# Patient Record
Sex: FEMALE | Race: WHITE | NOT HISPANIC OR LATINO | Employment: FULL TIME | URBAN - METROPOLITAN AREA
[De-identification: names, ages, dates, MRNs, and addresses within clinical notes are randomized per-mention and may not be internally consistent; named-entity substitution may affect disease eponyms.]

---

## 2017-01-19 ENCOUNTER — OFFICE VISIT (OUTPATIENT)
Dept: FAMILY MEDICINE CLINIC | Facility: CLINIC | Age: 42
End: 2017-01-19

## 2017-01-19 VITALS
SYSTOLIC BLOOD PRESSURE: 120 MMHG | DIASTOLIC BLOOD PRESSURE: 82 MMHG | WEIGHT: 155 LBS | RESPIRATION RATE: 16 BRPM | HEIGHT: 69 IN | BODY MASS INDEX: 22.96 KG/M2

## 2017-01-19 DIAGNOSIS — R10.30 LOWER ABDOMINAL PAIN: Primary | ICD-10-CM

## 2017-01-19 PROCEDURE — 99213 OFFICE O/P EST LOW 20 MIN: CPT | Performed by: INTERNAL MEDICINE

## 2017-01-19 RX ORDER — OMEPRAZOLE 40 MG/1
CAPSULE, DELAYED RELEASE ORAL
Qty: 90 CAPSULE | Refills: 1 | Status: SHIPPED | OUTPATIENT
Start: 2017-01-19 | End: 2018-01-04

## 2017-01-19 RX ORDER — OMEPRAZOLE 40 MG/1
40 CAPSULE, DELAYED RELEASE ORAL DAILY
Qty: 30 CAPSULE | Refills: 1 | Status: SHIPPED | OUTPATIENT
Start: 2017-01-19 | End: 2017-01-19 | Stop reason: SDUPTHER

## 2017-01-19 NOTE — MR AVS SNAPSHOT
Marlene Evangelista   1/19/2017 4:00 PM   Office Visit    Dept Phone:  [phone]   Encounter #:  [id number]    Provider:  Shawn Romero MD   Department:  [facility]                Your Full Care Plan              Today's Medication Changes          These changes are accurate as of: 1/19/17  4:08 PM.  If you have any questions, ask your nurse or doctor.               New Medication(s)Ordered:     omeprazole 40 MG capsule   Commonly known as:  PRILOSEC   Take 1 capsule by mouth Daily.   Started by:  Shawn Romero MD            Where to Get Your Medications      These medications were sent to [email or web address] [address] [place] [address] [address] [facility] [address] AT [facility] [phone] [facility] [phone]   [address] [facility] [address], [facility] [address]     Phone:  [phone]     omeprazole 40 MG capsule                  Your Updated Medication List          This list is accurate as of: 1/19/17  4:08 PM.  Always use your most recent med list.                acyclovir 200 MG capsule   Commonly known as:  ZOVIRAX   Take 1 capsule by mouth 5 (five) times a day.       FLUoxetine 40 MG capsule   Commonly known as:  PROzac       omeprazole 40 MG capsule   Commonly known as:  PRILOSEC   Take 1 capsule by mouth Daily.               You Were Diagnosed With        Codes Comments    Lower abdominal pain    -  Primary ICD-10-CM: R10.30  ICD-9-CM: 789.09       Instructions     None    Patient Instructions History      Upcoming Appointments     Visit Type Date Time Department    OFFICE VISIT 1/19/2017  4:00 PM [employer]    FOLLOW UP 1/26/2017  3:45 PM [facility] Signup     [facility] [employer] allows you to send messages to your doctor, view your test results, renew your prescriptions, schedule appointments, and more. To sign up, go to [employer] and click on the Sign Up Now link in the New  "User? box. Enter your [employer] Activation Code exactly as it appears below along with the last four digits of your Social Security Number and your Date of Birth () to complete the sign-up process. If you do not sign up before the expiration date, you must request a new code.    [employer] Activation Code: [email or web address]  Expires: 2017  4:08 PM    If you have questions, you can email [email or web address] or call [phone] to talk to our [employer] staff. Remember, [employer] is NOT to be used for urgent needs. For medical emergencies, dial 911.               Other Info from Your Visit           Your Appointments     2017  3:45 PM EST   Follow Up with Shawn Romero MD   [facility] (--)    [address] [facility] [address] [phone]           Arrive 15 minutes prior to appointment.              Allergies     No Known Allergies      Reason for Visit     Abdominal Pain patient c/o stomach burning      Vital Signs     Blood Pressure Respirations Height Weight Body Mass Index Smoking Status    120/82 16 69\" (175.3 cm) 155 lb (70.3 kg) 22.89 kg/m2 Former Smoker      Problems and Diagnoses Noted     Lower abdominal pain    -  Primary        "

## 2017-01-19 NOTE — PROGRESS NOTES
Subjective   Marlene Evangelista is a 41 y.o. female. Patient is here today for some burning abdominal pain that's been going on about a month.  She can't tied in with any special foods.  It seems to be mid to lower abdomen and is not associated with eating or bowel movements.  She has been taking some Pepcid that's not helping dramatically.  She denies any fevers or chills and the pain is not present all the time and is not excruciating.  She has had increased stress lately and wonders if that's playing a role.  There are no urinary symptoms and she has had a hysterectomy.    Chief Complaint   Patient presents with   • Abdominal Pain     patient c/o stomach burning          Vitals:    01/19/17 1536   BP: 120/82   Resp: 16     The following portions of the patient's history were reviewed and updated as appropriate: allergies, current medications, past family history, past medical history, past social history, past surgical history and problem list.    Past Medical History   Diagnosis Date   • Anxiety    • Vitamin D deficiency       No Known Allergies   Social History     Social History   • Marital status:      Spouse name: N/A   • Number of children: N/A   • Years of education: N/A     Occupational History   • Not on file.     Social History Main Topics   • Smoking status: Former Smoker   • Smokeless tobacco: Not on file      Comment: quit 2008   • Alcohol use Yes      Comment: 5 aweek   • Drug use: Not on file   • Sexual activity: Not on file     Other Topics Concern   • Not on file     Social History Narrative        Current Outpatient Prescriptions:   •  acyclovir (ZOVIRAX) 200 MG capsule, Take 1 capsule by mouth 5 (five) times a day., Disp: 60 capsule, Rfl: 5  •  FLUoxetine (PROzac) 40 MG capsule, , Disp: , Rfl:   •  omeprazole (PRILOSEC) 40 MG capsule, Take 1 capsule by mouth Daily., Disp: 30 capsule, Rfl: 1     Objective     History of Present Illness     Review of Systems   Constitutional: Negative.     HENT: Negative.    Eyes: Negative.    Respiratory: Negative.    Cardiovascular: Negative.    Gastrointestinal: Positive for abdominal pain. Negative for constipation, diarrhea and vomiting.   Genitourinary: Negative.    Musculoskeletal: Negative.    Skin: Negative.    Neurological: Negative.    Psychiatric/Behavioral: Negative.        Physical Exam   Constitutional: She is oriented to person, place, and time. She appears well-developed and well-nourished.   Pleasant, cooperative and in no acute distress.   HENT:   Head: Normocephalic and atraumatic.   Eyes: Conjunctivae are normal. Pupils are equal, round, and reactive to light.   Neck: Normal range of motion.   Cardiovascular: Normal rate, regular rhythm and normal heart sounds.    Pulmonary/Chest: Effort normal and breath sounds normal. No respiratory distress. She has no wheezes. She has no rales.   Abdominal: Soft. Bowel sounds are normal. She exhibits no distension and no mass. There is no tenderness. There is no rebound and no guarding.   Musculoskeletal: Normal range of motion. She exhibits no edema.   Neurological: She is alert and oriented to person, place, and time.   Skin: Skin is warm and dry.   Psychiatric: She has a normal mood and affect. Her behavior is normal.   Nursing note and vitals reviewed.      ASSESSMENT  patient's having some burning epigastric pain that's intermittent and is generally mid to lower abdomen.  There is no associated findings, no nausea vomiting change in bowel movements and no food associations.  She's been trying some Pepcid.  She has had some increased stress.  Her abdominal exam is completely benign and certainly nonsurgical     Problem List Items Addressed This Visit     None      Visit Diagnoses     Lower abdominal pain    -  Primary          PLAN  I'm going to try the patient on a course of omeprazole 40 mg daily and I would like to recheck her next week and make sure that things are calming down.  I don't believe she  needs endoscopy currently.      There are no Patient Instructions on file for this visit.  Return in about 1 week (around 1/26/2017).

## 2017-06-14 ENCOUNTER — APPOINTMENT (OUTPATIENT)
Dept: WOMENS IMAGING | Facility: HOSPITAL | Age: 42
End: 2017-06-14

## 2017-06-14 PROCEDURE — 77062 BREAST TOMOSYNTHESIS BI: CPT | Performed by: RADIOLOGY

## 2017-06-14 PROCEDURE — 77066 DX MAMMO INCL CAD BI: CPT | Performed by: RADIOLOGY

## 2017-12-20 ENCOUNTER — APPOINTMENT (OUTPATIENT)
Dept: WOMENS IMAGING | Facility: HOSPITAL | Age: 42
End: 2017-12-20

## 2017-12-20 PROCEDURE — 77067 SCR MAMMO BI INCL CAD: CPT | Performed by: RADIOLOGY

## 2017-12-20 PROCEDURE — 77063 BREAST TOMOSYNTHESIS BI: CPT | Performed by: RADIOLOGY

## 2018-01-04 ENCOUNTER — OFFICE VISIT (OUTPATIENT)
Dept: FAMILY MEDICINE CLINIC | Facility: CLINIC | Age: 43
End: 2018-01-04

## 2018-01-04 VITALS
SYSTOLIC BLOOD PRESSURE: 100 MMHG | WEIGHT: 169.2 LBS | BODY MASS INDEX: 25.06 KG/M2 | DIASTOLIC BLOOD PRESSURE: 72 MMHG | TEMPERATURE: 98 F | HEIGHT: 69 IN | OXYGEN SATURATION: 98 % | HEART RATE: 98 BPM

## 2018-01-04 DIAGNOSIS — M79.671 FOOT PAIN, RIGHT: Primary | ICD-10-CM

## 2018-01-04 PROCEDURE — 73630 X-RAY EXAM OF FOOT: CPT | Performed by: INTERNAL MEDICINE

## 2018-01-04 PROCEDURE — 99213 OFFICE O/P EST LOW 20 MIN: CPT | Performed by: INTERNAL MEDICINE

## 2018-01-04 RX ORDER — CETIRIZINE HYDROCHLORIDE 5 MG/1
5 TABLET ORAL DAILY
COMMUNITY
End: 2018-07-23

## 2018-01-04 NOTE — PROGRESS NOTES
Subjective   Marlene Evangelista is a 42 y.o. female. Patient is here today for pain in her right foot that's been going on for about a month.  She has been exercising vigorously and initially had swelling significantly in the right foot with pain in the right mid arch area and below the medial calcaneus.  Swelling has gradually decreased over the past month but she continues with some pain in the arch area although it is improved.  She never had any bruising.  She is able to ambulate on it just wants to make sure that nothing is broken.  Chief Complaint   Patient presents with   • Ankle Pain     PT HAVING RIGHT ANKLE PAIN AND SWELLING- NO INJURY THAT SHE KNOWS OF           Vitals:    01/04/18 1311   BP: 100/72   Pulse: 98   Temp: 98 °F (36.7 °C)   SpO2: 98%     The following portions of the patient's history were reviewed and updated as appropriate: allergies, current medications, past family history, past medical history, past social history, past surgical history and problem list.    Past Medical History:   Diagnosis Date   • Anxiety    • Vitamin D deficiency       No Known Allergies   Social History     Social History   • Marital status:      Spouse name: N/A   • Number of children: N/A   • Years of education: N/A     Occupational History   • Not on file.     Social History Main Topics   • Smoking status: Former Smoker   • Smokeless tobacco: Not on file      Comment: quit 2008   • Alcohol use Yes      Comment: 5 aweek   • Drug use: Not on file   • Sexual activity: Not on file     Other Topics Concern   • Not on file     Social History Narrative        Current Outpatient Prescriptions:   •  cetirizine (zyrTEC) 5 MG tablet, Take 5 mg by mouth Daily., Disp: , Rfl:   •  FLUoxetine (PROzac) 40 MG capsule, , Disp: , Rfl:      Objective     History of Present Illness     Review of Systems   Constitutional: Negative.    HENT: Negative.    Eyes: Negative.    Respiratory: Negative.    Cardiovascular: Negative.     Gastrointestinal: Negative.    Endocrine: Negative.    Genitourinary: Negative.    Neurological: Negative.    Psychiatric/Behavioral: Negative.        Physical Exam   Constitutional:   Generally healthy-appearing.   Musculoskeletal: Normal range of motion. She exhibits no edema.   Patient's right foot exhibits no swelling that I can see, no erythema and no bruising.  There is tenderness to palpation in the right mid arch area that's just mild to moderate.  The ankle joint seems intact.   Neurological: She is alert.   Skin: Skin is warm and dry.   Psychiatric: She has a normal mood and affect. Her behavior is normal.   Nursing note and vitals reviewed.      ASSESSMENT  patient has pain in the right mid arch area that is gradually improving.  X-ray of her right foot shows no bony injury that I can appreciate.  The patient does not feel that she needs an orthopedic referral currently.     Problem List Items Addressed This Visit     None      Visit Diagnoses     Foot pain, right    -  Primary          PLAN  She will moderate activities and advance them as tolerated based on the pain and can use some ibuprofen as needed.  She will return for follow-up if this does not resolve.    There are no Patient Instructions on file for this visit.  No Follow-up on file.

## 2018-01-16 DIAGNOSIS — F41.9 ANXIETY: ICD-10-CM

## 2018-01-16 DIAGNOSIS — Z00.00 ROUTINE HEALTH MAINTENANCE: Primary | ICD-10-CM

## 2018-01-23 ENCOUNTER — CLINICAL SUPPORT (OUTPATIENT)
Dept: FAMILY MEDICINE CLINIC | Facility: CLINIC | Age: 43
End: 2018-01-23

## 2018-01-23 DIAGNOSIS — Z00.00 ENCOUNTER FOR PREVENTIVE HEALTH EXAMINATION: ICD-10-CM

## 2018-01-23 DIAGNOSIS — Z00.00 ROUTINE HEALTH MAINTENANCE: Primary | ICD-10-CM

## 2018-01-23 LAB
ALBUMIN SERPL-MCNC: 4.6 G/DL (ref 3.5–5.2)
ALBUMIN/GLOB SERPL: 1.6 G/DL
ALP SERPL-CCNC: 54 U/L (ref 39–117)
ALT SERPL-CCNC: 13 U/L (ref 1–33)
APPEARANCE UR: CLEAR
AST SERPL-CCNC: 20 U/L (ref 1–32)
BILIRUB SERPL-MCNC: 0.7 MG/DL (ref 0.1–1.2)
BILIRUB UR QL STRIP: NEGATIVE
BUN SERPL-MCNC: 18 MG/DL (ref 6–20)
BUN/CREAT SERPL: 21.2 (ref 7–25)
CALCIUM SERPL-MCNC: 9.4 MG/DL (ref 8.6–10.5)
CHLORIDE SERPL-SCNC: 100 MMOL/L (ref 98–107)
CHOLEST SERPL-MCNC: 237 MG/DL (ref 0–200)
CO2 SERPL-SCNC: 26.6 MMOL/L (ref 22–29)
COLOR UR: YELLOW
CREAT SERPL-MCNC: 0.85 MG/DL (ref 0.57–1)
GLOBULIN SER CALC-MCNC: 2.8 GM/DL
GLUCOSE SERPL-MCNC: 86 MG/DL (ref 65–99)
GLUCOSE UR QL: NEGATIVE
HDLC SERPL-MCNC: 64 MG/DL (ref 40–60)
HGB UR QL STRIP: NEGATIVE
KETONES UR QL STRIP: NEGATIVE
LDLC SERPL CALC-MCNC: 151 MG/DL (ref 0–100)
LDLC/HDLC SERPL: 2.36 {RATIO}
LEUKOCYTE ESTERASE UR QL STRIP: NEGATIVE
NITRITE UR QL STRIP: NEGATIVE
PH UR STRIP: 7 [PH] (ref 5–8)
POTASSIUM SERPL-SCNC: 4.8 MMOL/L (ref 3.5–5.2)
PROT SERPL-MCNC: 7.4 G/DL (ref 6–8.5)
PROT UR QL STRIP: NEGATIVE
SODIUM SERPL-SCNC: 141 MMOL/L (ref 136–145)
SP GR UR: 1.02 (ref 1–1.03)
TRIGL SERPL-MCNC: 111 MG/DL (ref 0–150)
TSH SERPL DL<=0.005 MIU/L-ACNC: 2.3 MIU/ML (ref 0.27–4.2)
UROBILINOGEN UR STRIP-MCNC: NORMAL MG/DL
VLDLC SERPL CALC-MCNC: 22.2 MG/DL (ref 5–40)

## 2018-01-23 PROCEDURE — 71046 X-RAY EXAM CHEST 2 VIEWS: CPT | Performed by: INTERNAL MEDICINE

## 2018-01-23 PROCEDURE — 93000 ELECTROCARDIOGRAM COMPLETE: CPT | Performed by: INTERNAL MEDICINE

## 2018-01-23 PROCEDURE — 85025 COMPLETE CBC W/AUTO DIFF WBC: CPT | Performed by: INTERNAL MEDICINE

## 2018-01-30 ENCOUNTER — OFFICE VISIT (OUTPATIENT)
Dept: FAMILY MEDICINE CLINIC | Facility: CLINIC | Age: 43
End: 2018-01-30

## 2018-01-30 VITALS
HEIGHT: 69 IN | SYSTOLIC BLOOD PRESSURE: 106 MMHG | OXYGEN SATURATION: 98 % | WEIGHT: 174.4 LBS | HEART RATE: 79 BPM | DIASTOLIC BLOOD PRESSURE: 70 MMHG | TEMPERATURE: 98.6 F | BODY MASS INDEX: 25.83 KG/M2

## 2018-01-30 DIAGNOSIS — Z00.00 ANNUAL PHYSICAL EXAM: ICD-10-CM

## 2018-01-30 DIAGNOSIS — Z00.00 HEALTH MAINTENANCE EXAMINATION: ICD-10-CM

## 2018-01-30 DIAGNOSIS — Z83.71 FAMILY HISTORY OF COLONIC POLYPS: ICD-10-CM

## 2018-01-30 DIAGNOSIS — M41.24 OTHER IDIOPATHIC SCOLIOSIS, THORACIC REGION: ICD-10-CM

## 2018-01-30 DIAGNOSIS — E78.5 HYPERLIPIDEMIA, UNSPECIFIED HYPERLIPIDEMIA TYPE: ICD-10-CM

## 2018-01-30 DIAGNOSIS — B00.1 RECURRENT COLD SORES: ICD-10-CM

## 2018-01-30 DIAGNOSIS — F41.1 ANXIETY, GENERALIZED: ICD-10-CM

## 2018-01-30 DIAGNOSIS — Z12.11 ENCOUNTER FOR SCREENING COLONOSCOPY: ICD-10-CM

## 2018-01-30 PROCEDURE — 99396 PREV VISIT EST AGE 40-64: CPT | Performed by: INTERNAL MEDICINE

## 2018-01-30 PROCEDURE — 71046 X-RAY EXAM CHEST 2 VIEWS: CPT | Performed by: INTERNAL MEDICINE

## 2018-01-30 RX ORDER — PRAVASTATIN SODIUM 20 MG
20 TABLET ORAL DAILY
Qty: 90 TABLET | Refills: 3 | Status: SHIPPED | OUTPATIENT
Start: 2018-01-30 | End: 2019-01-21

## 2018-01-30 RX ORDER — ACYCLOVIR 200 MG/1
200 CAPSULE ORAL
Qty: 60 CAPSULE | Refills: 5 | Status: SHIPPED | OUTPATIENT
Start: 2018-01-30 | End: 2018-07-23

## 2018-01-30 NOTE — PROGRESS NOTES
Subjective   Marlene Evangelista is a 42 y.o. female. Patient is here today for a complete physical exam and follow-up on her hyperlipidemia and anxiety.  She generally is feeling well and has no new acute complaints.  She's had no chest pain, shortness of breath, edema or myalgias and is doing well on the Prozac.  PMH-history of a hysterectomy in 2015.  SH-patient now is  and dating again.  She is a nonsmoker and has about 6 glasses of wine per week.  She has regular gynecologic visits and is up-to-date on Pap smear and mammograms.  She has regular dental in vision checks.  Chief Complaint   Patient presents with   • Annual Exam     PT HERE FOR CPE          Vitals:    01/30/18 1052   BP: 106/70   Pulse: 79   Temp: 98.6 °F (37 °C)   SpO2: 98%     The following portions of the patient's history were reviewed and updated as appropriate: allergies, current medications, past family history, past medical history, past social history, past surgical history and problem list.    Past Medical History:   Diagnosis Date   • Anxiety    • Vitamin D deficiency       No Known Allergies   Social History     Social History   • Marital status:      Spouse name: N/A   • Number of children: N/A   • Years of education: N/A     Occupational History   • Not on file.     Social History Main Topics   • Smoking status: Former Smoker   • Smokeless tobacco: Not on file      Comment: quit 2008   • Alcohol use Yes      Comment: 5 aweek   • Drug use: Not on file   • Sexual activity: Not on file     Other Topics Concern   • Not on file     Social History Narrative        Current Outpatient Prescriptions:   •  cetirizine (zyrTEC) 5 MG tablet, Take 5 mg by mouth Daily., Disp: , Rfl:   •  FLUoxetine (PROzac) 40 MG capsule, , Disp: , Rfl:      Objective     History of Present Illness     Review of Systems   Constitutional: Negative.    HENT: Negative.    Eyes: Negative.    Respiratory: Negative.    Cardiovascular: Negative.     Gastrointestinal: Negative.    Endocrine: Negative.    Genitourinary: Negative.    Musculoskeletal: Negative.    Skin: Negative.    Allergic/Immunologic: Positive for environmental allergies.   Neurological: Negative.    Hematological: Negative.    Psychiatric/Behavioral: Negative.        Physical Exam   Constitutional: She is oriented to person, place, and time. She appears well-developed and well-nourished.   Pleasant, cooperative and in no distress with a blood pressure 120/80   HENT:   Head: Normocephalic and atraumatic.   Right Ear: Hearing, tympanic membrane, external ear and ear canal normal.   Left Ear: Hearing, tympanic membrane, external ear and ear canal normal.   Nose: Nose normal. Right sinus exhibits no maxillary sinus tenderness and no frontal sinus tenderness. Left sinus exhibits no maxillary sinus tenderness and no frontal sinus tenderness.   Mouth/Throat: Uvula is midline, oropharynx is clear and moist and mucous membranes are normal. No tonsillar exudate.   Eyes: Conjunctivae are normal. Pupils are equal, round, and reactive to light. No scleral icterus.   Neck: Normal range of motion. Neck supple. No JVD present. No tracheal deviation present. No thyromegaly present.   Cardiovascular: Normal rate, regular rhythm, normal heart sounds and intact distal pulses.  Exam reveals no gallop and no friction rub.    No murmur heard.  Pulmonary/Chest: Effort normal and breath sounds normal. No stridor. No respiratory distress. She has no wheezes. She has no rales. She exhibits no tenderness.   Abdominal: Soft. Bowel sounds are normal. She exhibits no distension and no mass. There is no tenderness. There is no rebound and no guarding. No hernia.   Musculoskeletal: Normal range of motion. She exhibits no edema or tenderness.   There is a probable small ganglion cyst on the radial aspect of the left wrist, not bothering the patient   Lymphadenopathy:     She has no cervical adenopathy.   Neurological: She is  alert and oriented to person, place, and time. She has normal reflexes.   Skin: Skin is warm and dry.   Tattoos especially on the left arm but also on the right arm and back   Psychiatric: She has a normal mood and affect. Her behavior is normal. Judgment and thought content normal.   Nursing note and vitals reviewed.      ASSESSMENT  EKG is normal.  The chest x-ray is stable showing some dextroscoliosis but otherwise normal and unchanged from a film in 2016.  CBC is normal.  CMP, urinalysis and TSH were all completely normal.  Lipid panel is elevated with a total cholesterol 237, a good HDL of 64 but an LDL of 151 and triglycerides of 111.  #1-hyperlipidemia, not adequately controlled  #2-history of anxiety controlled on medication  #3-dextroscoliosis of the thoracic spine, asymptomatic  #4-family history of colon polyps  #5-episodic cold sores     Problem List Items Addressed This Visit        Cardiovascular and Mediastinum    HLD (hyperlipidemia)       Other    Anxiety, generalized    Health maintenance examination      Other Visit Diagnoses     Annual physical exam    -  Primary    Relevant Orders    XR Chest PA & Lateral (Completed)          PLAN  I'm going to start the patient on pravastatin 20 mg daily.  I refilled her acyclovir.  I'm referring her to the general surgery group for routine colonoscopy.  I'd like to recheck the patient in 4 months with a CMP and lipid panel    There are no Patient Instructions on file for this visit.  No Follow-up on file.

## 2018-02-08 ENCOUNTER — OFFICE VISIT (OUTPATIENT)
Dept: FAMILY MEDICINE CLINIC | Facility: CLINIC | Age: 43
End: 2018-02-08

## 2018-02-08 VITALS
HEIGHT: 69 IN | HEART RATE: 92 BPM | WEIGHT: 172 LBS | DIASTOLIC BLOOD PRESSURE: 84 MMHG | OXYGEN SATURATION: 98 % | BODY MASS INDEX: 25.48 KG/M2 | RESPIRATION RATE: 16 BRPM | SYSTOLIC BLOOD PRESSURE: 124 MMHG | TEMPERATURE: 99.6 F

## 2018-02-08 DIAGNOSIS — J10.1 INFLUENZA A: ICD-10-CM

## 2018-02-08 DIAGNOSIS — R50.9 FEVER, UNSPECIFIED FEVER CAUSE: Primary | ICD-10-CM

## 2018-02-08 LAB
EXPIRATION DATE: ABNORMAL
FLUAV AG NPH QL: ABNORMAL
FLUBV AG NPH QL: NEGATIVE
INTERNAL CONTROL: ABNORMAL
Lab: ABNORMAL

## 2018-02-08 PROCEDURE — 99213 OFFICE O/P EST LOW 20 MIN: CPT | Performed by: INTERNAL MEDICINE

## 2018-02-08 PROCEDURE — 87804 INFLUENZA ASSAY W/OPTIC: CPT | Performed by: INTERNAL MEDICINE

## 2018-02-08 RX ORDER — GUAIFENESIN AND CODEINE PHOSPHATE 100; 10 MG/5ML; MG/5ML
5 SOLUTION ORAL 3 TIMES DAILY PRN
Qty: 180 ML | Refills: 0 | Status: SHIPPED | OUTPATIENT
Start: 2018-02-08 | End: 2018-07-23

## 2018-02-08 RX ORDER — OSELTAMIVIR PHOSPHATE 75 MG/1
75 CAPSULE ORAL 2 TIMES DAILY
Qty: 10 CAPSULE | Refills: 0 | Status: SHIPPED | OUTPATIENT
Start: 2018-02-08 | End: 2018-07-23

## 2018-02-11 PROBLEM — J10.1 INFLUENZA A: Status: ACTIVE | Noted: 2018-02-11

## 2018-02-11 NOTE — PROGRESS NOTES
Subjective   Marlene Evangelista is a 42 y.o. female. Patient is here today for   Chief Complaint   Patient presents with   • Nasal Congestion     pt states feels drowsy started 02/06/18   • Cough   • Vomiting     pt states has done it 2x yesterday and today    • Fever          Vitals:    02/08/18 1339   BP: 124/84   Pulse: 92   Resp: 16   Temp: 99.6 °F (37.6 °C)   SpO2: 98%       Past Medical History:   Diagnosis Date   • Anxiety    • Vitamin D deficiency       No Known Allergies   Social History     Social History   • Marital status:      Spouse name: N/A   • Number of children: N/A   • Years of education: N/A     Occupational History   • Not on file.     Social History Main Topics   • Smoking status: Former Smoker   • Smokeless tobacco: Former User      Comment: quit 2008   • Alcohol use Yes      Comment: 5 aweek   • Drug use: Not on file   • Sexual activity: Not on file     Other Topics Concern   • Not on file     Social History Narrative        Current Outpatient Prescriptions:   •  acyclovir (ZOVIRAX) 200 MG capsule, Take 1 capsule by mouth 5 (Five) Times a Day., Disp: 60 capsule, Rfl: 5  •  cetirizine (zyrTEC) 5 MG tablet, Take 5 mg by mouth Daily., Disp: , Rfl:   •  FLUoxetine (PROzac) 40 MG capsule, , Disp: , Rfl:   •  guaifenesin-codeine (GUAIFENESIN AC) 100-10 MG/5ML liquid, Take 5 mL by mouth 3 (Three) Times a Day As Needed for Cough., Disp: 180 mL, Rfl: 0  •  oseltamivir (TAMIFLU) 75 MG capsule, Take 1 capsule by mouth 2 (Two) Times a Day., Disp: 10 capsule, Rfl: 0  •  pravastatin (PRAVACHOL) 20 MG tablet, Take 1 tablet by mouth Daily., Disp: 90 tablet, Rfl: 3     Objective     HPI Comments: She has had fevers and chills and a cough at home.  She has myalgias.    Cough   Associated symptoms include a fever and myalgias.   Vomiting    Associated symptoms include coughing, a fever and myalgias.   Fever    Associated symptoms include coughing and vomiting.        Review of Systems   Constitutional:  Positive for fever.   Respiratory: Positive for cough.    Gastrointestinal: Positive for vomiting.   Musculoskeletal: Positive for myalgias.       Physical Exam   Constitutional: She is oriented to person, place, and time. She appears well-developed and well-nourished.   HENT:   Head: Normocephalic and atraumatic.   Cardiovascular: Normal rate, regular rhythm and normal heart sounds.    No murmur heard.  Pulmonary/Chest: Effort normal and breath sounds normal. No respiratory distress.   Neurological: She is alert and oriented to person, place, and time.   Psychiatric: She has a normal mood and affect. Her behavior is normal.   Nursing note and vitals reviewed.        Problem List Items Addressed This Visit        Respiratory    Influenza A    Relevant Medications    oseltamivir (TAMIFLU) 75 MG capsule      Other Visit Diagnoses     Fever, unspecified fever cause    -  Primary    Relevant Orders    POC Influenza A / B (Completed)            PLAN  She has influenza A.  The gave her Tamiflu 75 mg 1 by mouth twice a day.  I gave her prescription for codeine cough syrup to use when necessary cough.    She may use Tylenol or ibuprofen alternatingly for fevers or chills as needed.  I asked her to drink plenty of fluids and stay home and rest this weekend.  She will not be going to work today or tomorrow.  No Follow-up on file.

## 2018-05-23 DIAGNOSIS — E78.5 HYPERLIPIDEMIA, UNSPECIFIED HYPERLIPIDEMIA TYPE: Primary | ICD-10-CM

## 2018-07-23 ENCOUNTER — OFFICE VISIT (OUTPATIENT)
Dept: FAMILY MEDICINE CLINIC | Facility: CLINIC | Age: 43
End: 2018-07-23

## 2018-07-23 VITALS
SYSTOLIC BLOOD PRESSURE: 116 MMHG | TEMPERATURE: 98.2 F | HEIGHT: 69 IN | HEART RATE: 78 BPM | OXYGEN SATURATION: 98 % | DIASTOLIC BLOOD PRESSURE: 72 MMHG | BODY MASS INDEX: 25.92 KG/M2 | RESPIRATION RATE: 16 BRPM | WEIGHT: 175 LBS

## 2018-07-23 DIAGNOSIS — J40 BRONCHITIS: Primary | ICD-10-CM

## 2018-07-23 DIAGNOSIS — R09.82 PND (POST-NASAL DRIP): ICD-10-CM

## 2018-07-23 PROCEDURE — 99214 OFFICE O/P EST MOD 30 MIN: CPT | Performed by: INTERNAL MEDICINE

## 2018-07-23 RX ORDER — FLUTICASONE PROPIONATE 50 MCG
2 SPRAY, SUSPENSION (ML) NASAL DAILY
Qty: 18.2 BOTTLE | Refills: 5 | Status: SHIPPED | OUTPATIENT
Start: 2018-07-23 | End: 2019-01-21

## 2018-07-23 RX ORDER — AZITHROMYCIN 250 MG/1
TABLET, FILM COATED ORAL
Qty: 6 TABLET | Refills: 0 | Status: SHIPPED | OUTPATIENT
Start: 2018-07-23 | End: 2018-10-05

## 2018-07-23 NOTE — PATIENT INSTRUCTIONS
Suggest cetirizine 10 mg daily and starting fluticasone nasal spray at one puff twice a day.  Start azithromycin as instructed and drink plenty of fluids.

## 2018-07-23 NOTE — PROGRESS NOTES
Subjective   Marlene Evangelista is a 43 y.o. female. Patient is here today for   Chief Complaint   Patient presents with   • Nasal Congestion     chest congestion x 1 month    • Cough          Vitals:    07/23/18 1016   BP: 116/72   Pulse: 78   Resp: 16   Temp: 98.2 °F (36.8 °C)   SpO2: 98%     The following portions of the patient's history were reviewed and updated as appropriate: allergies, current medications, past family history, past medical history, past social history, past surgical history and problem list.    Past Medical History:   Diagnosis Date   • Anxiety    • Vitamin D deficiency       No Known Allergies   Social History     Social History   • Marital status:      Spouse name: N/A   • Number of children: N/A   • Years of education: N/A     Occupational History   • Not on file.     Social History Main Topics   • Smoking status: Former Smoker   • Smokeless tobacco: Former User      Comment: quit 2008   • Alcohol use Yes      Comment: 5 aweek   • Drug use: Unknown   • Sexual activity: Not on file     Other Topics Concern   • Not on file     Social History Narrative   • No narrative on file        Current Outpatient Prescriptions:   •  FLUoxetine (PROzac) 40 MG capsule, , Disp: , Rfl:   •  pravastatin (PRAVACHOL) 20 MG tablet, Take 1 tablet by mouth Daily., Disp: 90 tablet, Rfl: 3  •  azithromycin (ZITHROMAX) 250 MG tablet, Take 2 tablets the first day, then 1 tablet daily for 4 days., Disp: 6 tablet, Rfl: 0  •  fluticasone (FLONASE) 50 MCG/ACT nasal spray, 2 sprays into each nostril Daily., Disp: 18.2 bottle, Rfl: 5     Objective     History of Present Illness Marlene complains of postnasal drip and cough that started about a month ago.  It started with hoarseness and a sore throat.  She does have some morning nasal congestion.  She has been taking Claritin and Mucinex without relief.  She has been sitting up at night.  She denies fever, headache, sinus pressure.  She has no history of  allergies.    Review of Systems   Constitutional: Negative for fever.   HENT: Positive for congestion and postnasal drip. Negative for sinus pain and sinus pressure.    Respiratory: Positive for cough.        Physical Exam   Constitutional: She appears well-developed and well-nourished.   HENT:   Nose: Nose normal.   Mouth/Throat: Oropharynx is clear and moist.   Pulmonary/Chest: Effort normal. She has no wheezes. She has no rales.   Does have a bronchial cough   Neurological: She is alert.   Psychiatric: She has a normal mood and affect.   Vitals reviewed.      ASSESSMENT     Problem List Items Addressed This Visit        Respiratory    Bronchitis - Primary    Relevant Medications    fluticasone (FLONASE) 50 MCG/ACT nasal spray       Other    PND (post-nasal drip)          PLAN  Patient Instructions   Suggest cetirizine 10 mg daily and starting fluticasone nasal spray at one puff twice a day.  Start azithromycin as instructed and drink plenty of fluids.    No Follow-up on file.

## 2018-10-05 ENCOUNTER — OFFICE VISIT (OUTPATIENT)
Dept: FAMILY MEDICINE CLINIC | Facility: CLINIC | Age: 43
End: 2018-10-05

## 2018-10-05 VITALS
TEMPERATURE: 99 F | HEART RATE: 93 BPM | BODY MASS INDEX: 25.03 KG/M2 | DIASTOLIC BLOOD PRESSURE: 68 MMHG | SYSTOLIC BLOOD PRESSURE: 100 MMHG | OXYGEN SATURATION: 98 % | HEIGHT: 69 IN | WEIGHT: 169 LBS

## 2018-10-05 DIAGNOSIS — J40 BRONCHITIS: Primary | ICD-10-CM

## 2018-10-05 LAB
EXPIRATION DATE: NORMAL
FLUAV AG NPH QL: NEGATIVE
FLUBV AG NPH QL: NEGATIVE
INTERNAL CONTROL: NORMAL
Lab: NORMAL

## 2018-10-05 PROCEDURE — 87804 INFLUENZA ASSAY W/OPTIC: CPT | Performed by: INTERNAL MEDICINE

## 2018-10-05 PROCEDURE — 99213 OFFICE O/P EST LOW 20 MIN: CPT | Performed by: INTERNAL MEDICINE

## 2018-10-05 RX ORDER — PROMETHAZINE HYDROCHLORIDE AND CODEINE PHOSPHATE 6.25; 1 MG/5ML; MG/5ML
5 SYRUP ORAL EVERY 4 HOURS PRN
Qty: 180 ML | Refills: 0 | Status: SHIPPED | OUTPATIENT
Start: 2018-10-05 | End: 2019-01-21

## 2018-10-05 RX ORDER — LORAZEPAM 0.5 MG/1
TABLET ORAL
Refills: 0 | COMMUNITY
Start: 2018-09-24 | End: 2021-06-14 | Stop reason: DRUGHIGH

## 2018-10-05 RX ORDER — AZITHROMYCIN 250 MG/1
TABLET, FILM COATED ORAL
Qty: 6 TABLET | Refills: 0 | Status: SHIPPED | OUTPATIENT
Start: 2018-10-05 | End: 2019-01-21

## 2018-10-05 NOTE — PROGRESS NOTES
Subjective   Marlene Evangelista is a 43 y.o. female. Patient is here today for an upper respiratory infection.  She does a lot of flying and her job and return from [place] and has developed some cough with some upper sternal discomfort.  She denies fevers chills or pleurisy.  She does have some general malaise.  She's having no respiratory distress.  Chief Complaint   Patient presents with   • Cough   • Fatigue   • Headache   • Diarrhea          Vitals:    10/05/18 1439   BP: 100/68   Pulse: 93   Temp: 99 °F (37.2 °C)   SpO2: 98%     The following portions of the patient's history were reviewed and updated as appropriate: allergies, current medications, past family history, past medical history, past social history, past surgical history and problem list.    Past Medical History:   Diagnosis Date   • Anxiety    • Vitamin D deficiency       No Known Allergies   Social History     Social History   • Marital status:      Spouse name: N/A   • Number of children: N/A   • Years of education: N/A     Occupational History   • Not on file.     Social History Main Topics   • Smoking status: Former Smoker   • Smokeless tobacco: Former User      Comment: quit 2008   • Alcohol use Yes      Comment: 5 aweek   • Drug use: Unknown   • Sexual activity: Not on file     Other Topics Concern   • Not on file     Social History Narrative   • No narrative on file        Current Outpatient Prescriptions:   •  FLUoxetine (PROzac) 40 MG capsule, , Disp: , Rfl:   •  fluticasone (FLONASE) 50 MCG/ACT nasal spray, 2 sprays into each nostril Daily., Disp: 18.2 bottle, Rfl: 5  •  LORazepam (ATIVAN) 0.5 MG tablet, TK 1 T PO  QD PRN, Disp: , Rfl: 0  •  pravastatin (PRAVACHOL) 20 MG tablet, Take 1 tablet by mouth Daily., Disp: 90 tablet, Rfl: 3     Objective     History of Present Illness     Review of Systems   Constitutional: Negative.    HENT: Negative.    Eyes: Negative.    Respiratory: Negative.    Cardiovascular: Negative.     Gastrointestinal: Negative.    Genitourinary: Negative.    Musculoskeletal: Negative.    Skin: Negative.    Neurological: Negative.    Psychiatric/Behavioral: Negative.        Physical Exam   Constitutional: She is oriented to person, place, and time. She appears well-developed and well-nourished.   Pleasant, cooperative no distress but with frequent cough   HENT:   Head: Normocephalic and atraumatic.   Eyes: Pupils are equal, round, and reactive to light. Conjunctivae are normal. No scleral icterus.   Neck: Normal range of motion. Neck supple.   Cardiovascular: Normal rate, regular rhythm and normal heart sounds.    Pulmonary/Chest: Effort normal and breath sounds normal. No respiratory distress. She has no wheezes. She has no rales.   Musculoskeletal: Normal range of motion.   Neurological: She is alert and oriented to person, place, and time.   Skin: Skin is warm and dry.   Psychiatric: She has a normal mood and affect. Her behavior is normal.   Nursing note and vitals reviewed.      ASSESSMENT  influenza testing was negative.  #1-upper respiratory infection with some bronchitis, not improving after a week     Problem List Items Addressed This Visit        Respiratory    Bronchitis - Primary    Relevant Orders    POC Influenza A / B          PLAN  I'm going to start the patient on a Z-Juan Pablo and am getting her some Phenergan with codeine cough syrup.    There are no Patient Instructions on file for this visit.  No Follow-up on file.

## 2018-10-19 NOTE — TELEPHONE ENCOUNTER
ADVISED WITH DR BARAJAS AND CALLED INTO PHARM 180ML WITH 0 REFILLS       ----- Message from Elisabeth Barnett sent at 10/15/2018 11:47 AM EDT -----  PT CALLED IN STATING THAT SHE IS FEELING BETTER BUT STILL HAS A BAD COUGH THAT IS TAKING HER BREATH AWAY AND IS REQUESTING A REFILL OF THE promethazine-codeine (PHENERGAN with CODEINE) 6.25-10 MG/5ML syrup PLEASE SEND TO [facility] ON [facility].  PT IS LEAVING FOR OUT OF TOWN TOMORROW MORNING SO SHE WOULD LIKE TO GET SOMETHING TODAY.    PLEASE CONTACT PT -[phone]

## 2018-12-31 ENCOUNTER — APPOINTMENT (OUTPATIENT)
Dept: WOMENS IMAGING | Facility: HOSPITAL | Age: 43
End: 2018-12-31

## 2018-12-31 PROCEDURE — 77063 BREAST TOMOSYNTHESIS BI: CPT | Performed by: RADIOLOGY

## 2018-12-31 PROCEDURE — 77067 SCR MAMMO BI INCL CAD: CPT | Performed by: RADIOLOGY

## 2019-01-21 ENCOUNTER — OFFICE VISIT (OUTPATIENT)
Dept: FAMILY MEDICINE CLINIC | Facility: CLINIC | Age: 44
End: 2019-01-21

## 2019-01-21 VITALS
HEIGHT: 69 IN | RESPIRATION RATE: 16 BRPM | SYSTOLIC BLOOD PRESSURE: 118 MMHG | HEART RATE: 68 BPM | WEIGHT: 174 LBS | BODY MASS INDEX: 25.77 KG/M2 | DIASTOLIC BLOOD PRESSURE: 74 MMHG

## 2019-01-21 DIAGNOSIS — M79.672 LEFT FOOT PAIN: Primary | ICD-10-CM

## 2019-01-21 PROCEDURE — 99213 OFFICE O/P EST LOW 20 MIN: CPT | Performed by: INTERNAL MEDICINE

## 2019-01-21 PROCEDURE — 73630 X-RAY EXAM OF FOOT: CPT | Performed by: INTERNAL MEDICINE

## 2019-01-21 NOTE — PROGRESS NOTES
Subjective   Marlene Shepherd is a 43 y.o. female. Patient is here today for   Chief Complaint   Patient presents with   • Fall     last night while walking her dog   • Foot Pain     left foot           Vitals:    01/21/19 1251   BP: 118/74   Pulse: 68   Resp: 16     The following portions of the patient's history were reviewed and updated as appropriate: allergies, current medications, past family history, past medical history, past social history, past surgical history and problem list.    Past Medical History:   Diagnosis Date   • Anxiety    • Vitamin D deficiency       No Known Allergies   Social History     Socioeconomic History   • Marital status:      Spouse name: Not on file   • Number of children: Not on file   • Years of education: Not on file   • Highest education level: Not on file   Social Needs   • Financial resource strain: Not on file   • Food insecurity - worry: Not on file   • Food insecurity - inability: Not on file   • Transportation needs - medical: Not on file   • Transportation needs - non-medical: Not on file   Occupational History   • Not on file   Tobacco Use   • Smoking status: Former Smoker   • Smokeless tobacco: Former User   • Tobacco comment: quit 2008   Substance and Sexual Activity   • Alcohol use: Yes     Comment: 5 aweek   • Drug use: Not on file   • Sexual activity: Not on file   Other Topics Concern   • Not on file   Social History Narrative   • Not on file        Current Outpatient Medications:   •  FLUoxetine (PROzac) 40 MG capsule, , Disp: , Rfl:   •  LORazepam (ATIVAN) 0.5 MG tablet, TK 1 T PO  QD PRN, Disp: , Rfl: 0     Objective     History of Present Illness Marlene was walking and snow with boots on last night and fell backwards spraining her left foot.  She initially put ice on it and got it elevated.  She also took some ibuprofen which helped.  She has pain with weightbearing and is limping.    Review of Systems   Constitutional: Positive for activity change.    Musculoskeletal:        As in history of present illness       Physical Exam   Constitutional: She appears well-developed and well-nourished.   Musculoskeletal:   Left foot is mildly swollen dorsally.  There is tenderness of the first and second metatarsal bones.  Foot has normal range of motion.  Ankle exam is normal.   Psychiatric: She has a normal mood and affect. Her behavior is normal. Judgment and thought content normal.   Vitals reviewed.      ASSESSMENT     Problem List Items Addressed This Visit        Nervous and Auditory    Left foot pain - Primary    Relevant Orders    XR Foot 3+ View Left (In Office)          PLAN  Patient Instructions   X-ray of left foot is normal.  Suggest rest ice compression and elevation.  Suggest using crutches until he can weight-bear without pain.  Continue ibuprofen 400 mg every 4-6 hours as needed for pain.    Return if symptoms worsen or fail to improve.

## 2019-01-21 NOTE — PATIENT INSTRUCTIONS
X-ray of left foot is normal.  Suggest rest ice compression and elevation.  Suggest using crutches until he can weight-bear without pain.  Continue ibuprofen 400 mg every 4-6 hours as needed for pain.

## 2020-09-28 ENCOUNTER — OFFICE VISIT (OUTPATIENT)
Dept: FAMILY MEDICINE CLINIC | Facility: CLINIC | Age: 45
End: 2020-09-28

## 2020-09-28 VITALS
TEMPERATURE: 97.5 F | OXYGEN SATURATION: 100 % | HEIGHT: 69 IN | WEIGHT: 189.8 LBS | SYSTOLIC BLOOD PRESSURE: 131 MMHG | HEART RATE: 82 BPM | DIASTOLIC BLOOD PRESSURE: 80 MMHG | BODY MASS INDEX: 28.11 KG/M2 | RESPIRATION RATE: 16 BRPM
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
## 2020-09-28 DIAGNOSIS — R11.2 NAUSEA AND VOMITING, INTRACTABILITY OF VOMITING NOT SPECIFIED, UNSPECIFIED VOMITING TYPE: ICD-10-CM

## 2020-09-28 DIAGNOSIS — F41.1 ANXIETY, GENERALIZED: ICD-10-CM

## 2020-09-28 DIAGNOSIS — T14.8XXA MUSCLE STRAIN: ICD-10-CM

## 2020-09-28 DIAGNOSIS — S20.221A CONTUSION OF RIGHT BACK WALL OF THORAX, INITIAL ENCOUNTER: ICD-10-CM

## 2020-09-28 PROCEDURE — 99213 OFFICE O/P EST LOW 20 MIN: CPT | Performed by: NURSE PRACTITIONER

## 2020-09-28 RX ORDER — CYCLOBENZAPRINE HCL 5 MG
5 TABLET ORAL NIGHTLY PRN
Qty: 7 TABLET | Refills: 0 | Status: SHIPPED | OUTPATIENT
Start: 2020-09-28 | End: 2021-06-14

## 2020-09-28 RX ORDER — OMEPRAZOLE 20 MG/1
20 CAPSULE, DELAYED RELEASE ORAL DAILY
COMMUNITY
End: 2022-04-26 | Stop reason: SDUPTHER

## 2020-09-28 RX ORDER — PROMETHAZINE HYDROCHLORIDE 12.5 MG/1
12.5 TABLET ORAL EVERY 8 HOURS PRN
Qty: 12 TABLET | Refills: 0 | Status: SHIPPED | OUTPATIENT
Start: 2020-09-28 | End: 2021-06-14

## 2020-09-28 NOTE — PATIENT INSTRUCTIONS
Pt informed to start with a clear liquid diet for 24 hours, if tolerate may move to   Full liquid diet for 24 hours, and if tolerate that may move to a [place]/brat diet until feeling completely better. Pt informed to take otc probiotic daily, to drink plenty of fluids and rest. Pt informed to avoid dairy until feeling better.     Pt informed to take prozac daily as prescribed    Pt informed to monitor bruising on right flank and if having any further issues to call/rto.     Pt verb. Understanding to all above.

## 2020-09-28 NOTE — PROGRESS NOTES
Subjective     Marlene Shepherd is a 45 y.o.. female.     Pt here today with c/o falling down steps on Friday. Pt stating she mis-stepped and fell down 4-5 steps, hitting her right side. Pt stating she did not hit her head, she did not loose consciousness. She put Ice on right side for 24  Hours and then heat to right side for 24 hours.Pt did not go anywhere to get seen since fall until now. Pt stating she took ibuprofen and had relief of side pain. Pt stating that Sunday night she started vomiting. Pt stating she vomited 3 times with clear liquid results last night. Pt stating she then vomited once this morning after drinking some coffee; pt stating that this am results were liquid as well. Pt stating that on Saturday night the whole family ate pizza that they had ordered; On Sunday morning she ate bisquits and gravy and eggs and ate the cheese pizza again for lunch on Sunday. Pt stating that all of her family members ate what she had from Saturday and Sunday and no one else sick. Pt stating she did not eat dinner on Sunday. Pt denies being around anyone sick recently. Pt having a hx of anxiety and admitting that she is not taking prozac daily as prescribed. Pt stating she takes Prozac but not as prescribed. Pt admitting her job has been more stressful recently.       The following portions of the patient's history were reviewed and updated as appropriate: allergies, current medications, past family history, past medical history, past social history, past surgical history and problem list.    Past Medical History:   Diagnosis Date   • Anxiety    • Vitamin D deficiency        Past Surgical History:   Procedure Laterality Date   • HYSTERECTOMY         Review of Systems   Constitutional: Negative for fatigue and fever.   HENT: Negative for congestion, ear pain, rhinorrhea and sore throat.    Respiratory: Negative for cough and shortness of breath.    Cardiovascular: Negative for chest pain and palpitations.  "  Gastrointestinal: Positive for diarrhea (off and on, history of, not new), nausea and vomiting. Negative for abdominal pain.   Genitourinary: Negative for dysuria, frequency and urgency.   Musculoskeletal: Positive for arthralgias. Gait problem: right side, right hip/leg s/p fall.   Neurological: Negative for dizziness, light-headedness and headaches.   Psychiatric/Behavioral: The patient is nervous/anxious.        No Known Allergies    Objective     Vitals:    09/28/20 1418   BP: 131/80   BP Location: Left arm   Patient Position: Sitting   Cuff Size: Adult   Pulse: 82   Resp: 16   Temp: 97.5 °F (36.4 °C)   SpO2: 100%   Weight: 86.1 kg (189 lb 12.8 oz)   Height: 175.3 cm (69\")     Body mass index is 28.03 kg/m².    Physical Exam  Vitals signs reviewed.   HENT:      Head: Normocephalic.      Right Ear: Tympanic membrane normal.      Left Ear: Tympanic membrane normal.   Eyes:      Pupils: Pupils are equal, round, and reactive to light.   Neck:      Musculoskeletal: Normal range of motion.   Cardiovascular:      Rate and Rhythm: Normal rate and regular rhythm.   Pulmonary:      Effort: Pulmonary effort is normal.      Breath sounds: Normal breath sounds.   Abdominal:      General: Bowel sounds are normal. There is no distension.      Palpations: Abdomen is soft. There is no hepatomegaly, splenomegaly or mass.      Tenderness: There is no abdominal tenderness. There is no guarding or rebound. Negative signs include McBurney's sign.      Hernia: No hernia is present.   Musculoskeletal: Normal range of motion.      Comments: Right ribs: normal, no abnormalities noted, no tenderness to palpation   Lymphadenopathy:      Cervical: No cervical adenopathy.   Skin:     General: Skin is warm and dry.      Comments: Red bruising noted to right flank area, no tenderness to the touch, no open skin areas noted   Neurological:      Mental Status: She is alert and oriented to person, place, and time.           Current Outpatient " Medications:   •  FLUoxetine (PROzac) 40 MG capsule, , Disp: , Rfl:   •  LORazepam (ATIVAN) 0.5 MG tablet, TK 1 T PO  QD PRN, Disp: , Rfl: 0  •  omeprazole (priLOSEC) 20 MG capsule, Take 20 mg by mouth Daily., Disp: , Rfl:   •  cyclobenzaprine (FLEXERIL) 5 MG tablet, Take 1 tablet by mouth At Night As Needed for Muscle Spasms., Disp: 7 tablet, Rfl: 0  •  promethazine (PHENERGAN) 12.5 MG tablet, Take 1 tablet by mouth Every 8 (Eight) Hours As Needed for Nausea or Vomiting., Disp: 12 tablet, Rfl: 0        Assessment/Plan   Marlene was seen today for vomiting.    Diagnoses and all orders for this visit:    Fall, initial encounter    Nausea and vomiting, intractability of vomiting not specified, unspecified vomiting type  -     promethazine (PHENERGAN) 12.5 MG tablet; Take 1 tablet by mouth Every 8 (Eight) Hours As Needed for Nausea or Vomiting.    Contusion of right back wall of thorax, initial encounter    Muscle strain  -     cyclobenzaprine (FLEXERIL) 5 MG tablet; Take 1 tablet by mouth At Night As Needed for Muscle Spasms.    Anxiety, generalized        Patient Instructions   Pt informed to start with a clear liquid diet for 24 hours, if tolerate may move to   Full liquid diet for 24 hours, and if tolerate that may move to a [place]/brat diet until feeling completely better. Pt informed to take otc probiotic daily, to drink plenty of fluids and rest. Pt informed to avoid dairy until feeling better.     Pt informed to take prozac daily as prescribed    Pt informed to monitor bruising on right flank and if having any further issues to call/rto.     Pt verb. Understanding to all above.       Return for Dr. Romero as needed/as recommended.

## 2020-10-15 DIAGNOSIS — F41.1 ANXIETY, GENERALIZED: ICD-10-CM

## 2020-10-15 DIAGNOSIS — E78.5 HYPERLIPIDEMIA, UNSPECIFIED HYPERLIPIDEMIA TYPE: Primary | ICD-10-CM

## 2020-10-15 DIAGNOSIS — Z13.83 ENCOUNTER FOR SCREENING FOR RESPIRATORY DISORDER: Primary | ICD-10-CM

## 2020-10-15 DIAGNOSIS — Z00.00 ROUTINE HEALTH MAINTENANCE: ICD-10-CM

## 2020-10-23 ENCOUNTER — HOSPITAL ENCOUNTER (OUTPATIENT)
Dept: GENERAL RADIOLOGY | Facility: HOSPITAL | Age: 45
Discharge: HOME | End: 2020-10-23
Admitting: INTERNAL MEDICINE

## 2020-10-23 PROCEDURE — 71046 X-RAY EXAM CHEST 2 VIEWS: CPT

## 2020-10-24 LAB
ALBUMIN SERPL-MCNC: 4.9 G/DL (ref 3.5–5.2)
ALBUMIN/GLOB SERPL: 2 G/DL
ALP SERPL-CCNC: 80 U/L (ref 39–117)
ALT SERPL-CCNC: 23 U/L (ref 1–33)
APPEARANCE UR: ABNORMAL
AST SERPL-CCNC: 30 U/L (ref 1–32)
BASOPHILS # BLD AUTO: 0.05 10*3/MM3 (ref 0–0.2)
BASOPHILS NFR BLD AUTO: 0.7 % (ref 0–1.5)
BILIRUB SERPL-MCNC: 0.3 MG/DL (ref 0–1.2)
BILIRUB UR QL STRIP: NEGATIVE
BUN SERPL-MCNC: 12 MG/DL (ref 6–20)
BUN/CREAT SERPL: 13.2 (ref 7–25)
CALCIUM SERPL-MCNC: 9.9 MG/DL (ref 8.6–10.5)
CHLORIDE SERPL-SCNC: 103 MMOL/L (ref 98–107)
CHOLEST SERPL-MCNC: 258 MG/DL (ref 0–200)
CO2 SERPL-SCNC: 28.2 MMOL/L (ref 22–29)
COLOR UR: YELLOW
CREAT SERPL-MCNC: 0.91 MG/DL (ref 0.57–1)
EOSINOPHIL # BLD AUTO: 0.1 10*3/MM3 (ref 0–0.4)
EOSINOPHIL NFR BLD AUTO: 1.5 % (ref 0.3–6.2)
ERYTHROCYTE [DISTWIDTH] IN BLOOD BY AUTOMATED COUNT: 12.4 % (ref 12.3–15.4)
GLOBULIN SER CALC-MCNC: 2.5 GM/DL
GLUCOSE SERPL-MCNC: 90 MG/DL (ref 65–99)
GLUCOSE UR QL: NEGATIVE
HCT VFR BLD AUTO: 37.8 % (ref 34–46.6)
HDLC SERPL-MCNC: 57 MG/DL (ref 40–60)
HGB BLD-MCNC: 12.8 G/DL (ref 12–15.9)
HGB UR QL STRIP: NEGATIVE
IMM GRANULOCYTES # BLD AUTO: 0.03 10*3/MM3 (ref 0–0.05)
IMM GRANULOCYTES NFR BLD AUTO: 0.4 % (ref 0–0.5)
KETONES UR QL STRIP: NEGATIVE
LDLC SERPL CALC-MCNC: 179 MG/DL (ref 0–100)
LDLC/HDLC SERPL: 3.09 {RATIO}
LEUKOCYTE ESTERASE UR QL STRIP: NEGATIVE
LYMPHOCYTES # BLD AUTO: 1.67 10*3/MM3 (ref 0.7–3.1)
LYMPHOCYTES NFR BLD AUTO: 24.8 % (ref 19.6–45.3)
MCH RBC QN AUTO: 31.1 PG (ref 26.6–33)
MCHC RBC AUTO-ENTMCNC: 33.9 G/DL (ref 31.5–35.7)
MCV RBC AUTO: 92 FL (ref 79–97)
MONOCYTES # BLD AUTO: 0.57 10*3/MM3 (ref 0.1–0.9)
MONOCYTES NFR BLD AUTO: 8.5 % (ref 5–12)
NEUTROPHILS # BLD AUTO: 4.32 10*3/MM3 (ref 1.7–7)
NEUTROPHILS NFR BLD AUTO: 64.1 % (ref 42.7–76)
NITRITE UR QL STRIP: NEGATIVE
NRBC BLD AUTO-RTO: 0 /100 WBC (ref 0–0.2)
PH UR STRIP: 6 [PH] (ref 5–8)
PLATELET # BLD AUTO: 374 10*3/MM3 (ref 140–450)
POTASSIUM SERPL-SCNC: 5 MMOL/L (ref 3.5–5.2)
PROT SERPL-MCNC: 7.4 G/DL (ref 6–8.5)
PROT UR QL STRIP: NEGATIVE
RBC # BLD AUTO: 4.11 10*6/MM3 (ref 3.77–5.28)
SODIUM SERPL-SCNC: 140 MMOL/L (ref 136–145)
SP GR UR: 1.02 (ref 1–1.03)
TRIGL SERPL-MCNC: 125 MG/DL (ref 0–150)
TSH SERPL DL<=0.005 MIU/L-ACNC: 2.53 UIU/ML (ref 0.27–4.2)
UROBILINOGEN UR STRIP-MCNC: ABNORMAL MG/DL
VLDLC SERPL CALC-MCNC: 22 MG/DL (ref 5–40)
WBC # BLD AUTO: 6.74 10*3/MM3 (ref 3.4–10.8)

## 2020-10-29 ENCOUNTER — OFFICE VISIT (OUTPATIENT)
Dept: FAMILY MEDICINE CLINIC | Facility: CLINIC | Age: 45
End: 2020-10-29

## 2020-10-29 VITALS
WEIGHT: 192.4 LBS | DIASTOLIC BLOOD PRESSURE: 83 MMHG | OXYGEN SATURATION: 99 % | HEIGHT: 69 IN | SYSTOLIC BLOOD PRESSURE: 123 MMHG | BODY MASS INDEX: 28.5 KG/M2 | RESPIRATION RATE: 16 BRPM | TEMPERATURE: 98 F | HEART RATE: 80 BPM

## 2020-10-29 DIAGNOSIS — Z23 NEED FOR VACCINATION: ICD-10-CM

## 2020-10-29 DIAGNOSIS — Z00.00 HEALTH MAINTENANCE EXAMINATION: Primary | ICD-10-CM

## 2020-10-29 DIAGNOSIS — E78.5 HYPERLIPIDEMIA, UNSPECIFIED HYPERLIPIDEMIA TYPE: ICD-10-CM

## 2020-10-29 PROCEDURE — 90715 TDAP VACCINE 7 YRS/> IM: CPT | Performed by: INTERNAL MEDICINE

## 2020-10-29 PROCEDURE — 90471 IMMUNIZATION ADMIN: CPT | Performed by: INTERNAL MEDICINE

## 2020-10-29 PROCEDURE — 99396 PREV VISIT EST AGE 40-64: CPT | Performed by: INTERNAL MEDICINE

## 2020-10-29 RX ORDER — ATORVASTATIN CALCIUM 10 MG/1
10 TABLET, FILM COATED ORAL DAILY
Qty: 90 TABLET | Refills: 2 | Status: SHIPPED | OUTPATIENT
Start: 2020-10-29 | End: 2021-06-14

## 2020-10-29 NOTE — PROGRESS NOTES
Subjective   Marlene Shepherd is a 45 y.o. female. Patient is here today for a complete physical exam.  She generally is feeling well and has no acute complaints.  PMH-history of hyperlipidemia, anxiety and vitamin D deficiency.  She is status post hysterectomy in 2015.  SH-patient's , sexually active.  She has never used tobacco products and has about 1 to 2 glasses of wine per night.  She is up-to-date with her gynecologist on mammograms and Pap smears.  She has regular dental and vision checks.  She is not exercising.  FH-no significant family diseases  Chief Complaint   Patient presents with   • Annual Exam          Vitals:    10/29/20 1103   BP: 123/83   Pulse: 80   Resp: 16   Temp: 98 °F (36.7 °C)   SpO2: 99%     Body mass index is 28.41 kg/m².    The following portions of the patient's history were reviewed and updated as appropriate: allergies, current medications, past family history, past medical history, past social history, past surgical history and problem list.    Past Medical History:   Diagnosis Date   • Anxiety    • Vitamin D deficiency       No Known Allergies   Social History     Socioeconomic History   • Marital status:      Spouse name: Not on file   • Number of children: Not on file   • Years of education: Not on file   • Highest education level: Not on file   Tobacco Use   • Smoking status: Former Smoker   • Smokeless tobacco: Former User   • Tobacco comment: quit 2008   Substance and Sexual Activity   • Alcohol use: Yes     Comment: 5 aweek        Current Outpatient Medications:   •  FLUoxetine (PROzac) 40 MG capsule, , Disp: , Rfl:   •  LORazepam (ATIVAN) 0.5 MG tablet, TK 1 T PO  QD PRN, Disp: , Rfl: 0  •  omeprazole (priLOSEC) 20 MG capsule, Take 20 mg by mouth Daily., Disp: , Rfl:   •  atorvastatin (LIPITOR) 10 MG tablet, Take 1 tablet by mouth Daily., Disp: 90 tablet, Rfl: 2  •  cyclobenzaprine (FLEXERIL) 5 MG tablet, Take 1 tablet by mouth At Night As Needed for Muscle  Spasms., Disp: 7 tablet, Rfl: 0  •  promethazine (PHENERGAN) 12.5 MG tablet, Take 1 tablet by mouth Every 8 (Eight) Hours As Needed for Nausea or Vomiting., Disp: 12 tablet, Rfl: 0     EKG  ECG Report    Objective   History of Present Illness   Marlene Shepherd 45 y.o. female who presents for an Annual Wellness Visit.  she has a history of   Patient Active Problem List   Diagnosis   • Anxiety, generalized   • HLD (hyperlipidemia)   • Health maintenance examination   • Family history of colonic polyps   • Idiopathic scoliosis of thoracic spine   • Recurrent cold sores   .  she has been doing well with new interval problems.  Labs results discussed in detail with the patient.  Plan to update vaccines if needed today.    Health Habits:  Dental Exam. up to date  Eye Exam. up to date  Exercise: 0 times/week.  Current exercise activities include: none      Lab Results (most recent)     None            Review of Systems   Constitutional: Negative.    HENT: Negative.    Respiratory: Negative.    Cardiovascular: Negative.    Gastrointestinal: Negative.    Endocrine: Negative.    Genitourinary: Negative.    Musculoskeletal: Negative.    Skin: Negative.    Neurological: Negative.    Hematological: Negative.    Psychiatric/Behavioral: Negative.        Physical Exam  Vitals signs and nursing note reviewed.   Constitutional:       General: She is not in acute distress.     Appearance: Normal appearance. She is normal weight. She is not ill-appearing.   HENT:      Head: Normocephalic and atraumatic.      Right Ear: Tympanic membrane, ear canal and external ear normal. There is no impacted cerumen.      Left Ear: Tympanic membrane, ear canal and external ear normal. There is no impacted cerumen.   Eyes:      General: No scleral icterus.        Right eye: No discharge.         Left eye: No discharge.      Extraocular Movements: Extraocular movements intact.      Conjunctiva/sclera: Conjunctivae normal.      Pupils: Pupils are  equal, round, and reactive to light.   Neck:      Musculoskeletal: Normal range of motion and neck supple. No neck rigidity or muscular tenderness.      Vascular: No carotid bruit.   Cardiovascular:      Rate and Rhythm: Normal rate and regular rhythm.      Heart sounds: Normal heart sounds. No murmur. No friction rub. No gallop.    Pulmonary:      Effort: Pulmonary effort is normal. No respiratory distress.      Breath sounds: Normal breath sounds. No stridor. No wheezing, rhonchi or rales.   Chest:      Chest wall: No tenderness.   Abdominal:      General: Abdomen is flat. Bowel sounds are normal. There is no distension.      Palpations: Abdomen is soft. There is no mass.      Tenderness: There is no abdominal tenderness. There is no right CVA tenderness, left CVA tenderness, guarding or rebound.      Hernia: No hernia is present.   Musculoskeletal: Normal range of motion.      Right lower leg: No edema.      Left lower leg: No edema.   Lymphadenopathy:      Cervical: No cervical adenopathy.   Skin:     General: Skin is warm and dry.   Neurological:      General: No focal deficit present.      Mental Status: She is alert and oriented to person, place, and time.   Psychiatric:         Mood and Affect: Mood normal.         Behavior: Behavior normal.         Thought Content: Thought content normal.         Judgment: Judgment normal.         ASSESSMENT chest x-ray films were reviewed by me and show a thoracic levoscoliosis but otherwise were essentially normal with no active disease.  CBC was normal.  Urinalysis was clear.  TSH was quite normal.  CMP was completely normal.  Lipid panel was elevated off of statin with a total cholesterol of 258, HDL 57 and   #1-essentially healthy female  #2-hyperlipidemia, uncontrolled by diet       Problems Addressed this Visit        Cardiovascular and Mediastinum    HLD (hyperlipidemia)    Relevant Medications    atorvastatin (LIPITOR) 10 MG tablet       Other    Health  maintenance examination - Primary      Diagnoses       Codes Comments    Health maintenance examination    -  Primary ICD-10-CM: Z00.00  ICD-9-CM: V70.0     Hyperlipidemia, unspecified hyperlipidemia type     ICD-10-CM: E78.5  ICD-9-CM: 272.4           PLAN the patient received a Tdap immunization today.  She has gotten the flu shot earlier.  I am starting her on atorvastatin 10 mg daily for her cholesterol and would like to recheck her in 6 months with a CMP, lipid panel, vitamin D level and hepatitis C screen per protocol    There are no Patient Instructions on file for this visit.    Return in about 6 months (around 4/29/2021) for with labs.

## 2021-03-18 ENCOUNTER — APPOINTMENT (OUTPATIENT)
Dept: WOMENS IMAGING | Facility: HOSPITAL | Age: 46
End: 2021-03-18

## 2021-03-18 PROCEDURE — 77067 SCR MAMMO BI INCL CAD: CPT | Performed by: RADIOLOGY

## 2021-03-18 PROCEDURE — 77063 BREAST TOMOSYNTHESIS BI: CPT | Performed by: RADIOLOGY

## 2021-03-26 ENCOUNTER — APPOINTMENT (OUTPATIENT)
Dept: VACCINE CLINIC | Facility: HOSPITAL | Age: 46
End: 2021-03-26

## 2021-03-31 ENCOUNTER — IMMUNIZATION (OUTPATIENT)
Dept: VACCINE CLINIC | Facility: HOSPITAL | Age: 46
End: 2021-03-31

## 2021-03-31 PROCEDURE — 91300 HC SARSCOV02 VAC 30MCG/0.3ML IM: CPT | Performed by: INTERNAL MEDICINE

## 2021-03-31 PROCEDURE — 0001A: CPT | Performed by: INTERNAL MEDICINE

## 2021-04-06 DIAGNOSIS — E78.5 HYPERLIPIDEMIA, UNSPECIFIED HYPERLIPIDEMIA TYPE: ICD-10-CM

## 2021-04-06 DIAGNOSIS — Z11.59 NEED FOR HEPATITIS C SCREENING TEST: ICD-10-CM

## 2021-04-21 ENCOUNTER — IMMUNIZATION (OUTPATIENT)
Dept: VACCINE CLINIC | Facility: HOSPITAL | Age: 46
End: 2021-04-21

## 2021-04-21 PROCEDURE — 0002A: CPT | Performed by: INTERNAL MEDICINE

## 2021-04-21 PROCEDURE — 91300 HC SARSCOV02 VAC 30MCG/0.3ML IM: CPT | Performed by: INTERNAL MEDICINE

## 2021-06-11 RX ORDER — LORAZEPAM 1 MG/1
1 TABLET ORAL DAILY PRN
COMMUNITY
Start: 2021-04-12 | End: 2022-04-26

## 2021-06-14 ENCOUNTER — OFFICE VISIT (OUTPATIENT)
Dept: SURGERY | Facility: CLINIC | Age: 46
End: 2021-06-14

## 2021-06-14 VITALS
DIASTOLIC BLOOD PRESSURE: 80 MMHG | SYSTOLIC BLOOD PRESSURE: 160 MMHG | WEIGHT: 192 LBS | OXYGEN SATURATION: 98 % | HEART RATE: 80 BPM | HEIGHT: 69 IN | BODY MASS INDEX: 28.44 KG/M2 | TEMPERATURE: 97.3 F

## 2021-06-14 DIAGNOSIS — L91.8 SKIN TAG: ICD-10-CM

## 2021-06-14 DIAGNOSIS — Z12.11 SCREENING FOR MALIGNANT NEOPLASM OF COLON: Primary | ICD-10-CM

## 2021-06-14 PROCEDURE — 99244 OFF/OP CNSLTJ NEW/EST MOD 40: CPT | Performed by: COLON & RECTAL SURGERY

## 2021-06-14 RX ORDER — PRAVASTATIN SODIUM 10 MG
10 TABLET ORAL DAILY
COMMUNITY
End: 2022-04-26

## 2021-06-14 RX ORDER — BUPROPION HYDROCHLORIDE 150 MG/1
150 TABLET ORAL DAILY
COMMUNITY
End: 2022-04-26

## 2021-06-14 RX ORDER — SODIUM CHLORIDE, SODIUM LACTATE, POTASSIUM CHLORIDE, CALCIUM CHLORIDE 600; 310; 30; 20 MG/100ML; MG/100ML; MG/100ML; MG/100ML
30 INJECTION, SOLUTION INTRAVENOUS CONTINUOUS
Status: CANCELLED | OUTPATIENT
Start: 2021-07-29

## 2021-06-14 NOTE — PROGRESS NOTES
Marlene Shepherd is a 46 y.o. female who is seen as a consult at the request of TALHA Bang for Skin Problem.      HPI:  Pt reports that she has had complications with hemorrhoids in the past. Concerned today about an area with extra tissue around the anus.  Does not notice the area on the daily basis.  No RB  Not using any topical creams or ointments  Reports that she had RB in the past and due to her FHx of colon cancer (Grandmother) and father (polyps), she had a colonoscopy on 2006. Internal hemorrhoids present at that time, otherwise normal.  No abnormal pap smears.     Past Medical History:   Diagnosis Date   • Abnormal uterine bleeding (AUB) 2015   • Allergic rhinitis    • Anal skin tag 2021   • Anxiety    • Decreased libido    • Endometrial mass 2015   • Fall 2020    CONTUSION OF RIGHT BACK WALL OF THORAX, MUSCLE STRAIN   • Hemorrhoids    • Hyperlipidemia    • Idiopathic scoliosis of thoracic spine 2018    Dextroscoliosis of the thoracic spine   • Infertility, female    • Influenza A 2018   • Laryngeal spasm 10/2018   • Migraines    • Recurrent cold sores 2018   • SAB (spontaneous ) 2013     A1   • Uterine fibroid    • Vitamin D deficiency    • Vulvodynia        Past Surgical History:   Procedure Laterality Date   • COLONOSCOPY N/A 2006    HEMORRHOIDS, DR. DARLENE BAINS AT [facility]   • DILATATION AND CURETTAGE N/A 2013    FOR SAB, DR. LAVELL BLANCA AT [facility]   • HYSTERECTOMY N/A 2015    LAPAROSCOPIC VAGINAL HYSTERECTOMY WITH BSO, DR. CONCEPCION ROMAN AT [facility]   • HYSTEROSCOPY MYOMECTOMY N/A 2015    ENDOMETRIAL MASS, DR. CONCEPCION ROMAN AT [facility]       Social History:   reports that she quit smoking about 13 years ago. Her smoking use included cigarettes. She smoked 1.00 pack per day. She has never used smokeless tobacco. She reports current alcohol use. She reports that she does not use drugs.    Marriage status:     Family  History   Problem Relation Age of Onset   • Depression Other    • Gout Other    • Hyperlipidemia Other    • Colon cancer Other    • Diabetes Other    • Colon polyps Other    • Cancer Maternal Grandmother    • Kidney disease Maternal Grandmother    • Brain cancer Paternal Grandmother    • Cancer Paternal Grandmother    • COPD Paternal Grandfather    • Alcohol abuse Mother    • Hypertension Mother    • Other Mother         SEPSIS   • Alcohol abuse Father    • Hypertension Father    • Colon polyps Father    • Hypertension Brother    • Alcohol abuse Maternal Grandfather    • Cancer Maternal Grandfather    • Kidney cancer Maternal Grandfather        Current Outpatient Medications:   •  buPROPion XL (WELLBUTRIN XL) 150 MG 24 hr tablet, Take 150 mg by mouth Daily., Disp: , Rfl:   •  FLUoxetine (PROzac) 40 MG capsule, , Disp: , Rfl:   •  LORazepam (ATIVAN) 1 MG tablet, Take 1 mg by mouth Daily As Needed. for anxiety, Disp: , Rfl:   •  omeprazole (priLOSEC) 20 MG capsule, Take 20 mg by mouth Daily., Disp: , Rfl:   •  pravastatin (PRAVACHOL) 10 MG tablet, Take 10 mg by mouth Daily., Disp: , Rfl:     Allergy  Patient has no known allergies.    Review of Systems   Constitutional: Negative for decreased appetite and weight gain.   HENT: Negative for congestion, hearing loss and hoarse voice.    Eyes: Negative for blurred vision, discharge and visual disturbance.   Cardiovascular: Negative for chest pain, cyanosis and leg swelling.   Respiratory: Negative for cough, shortness of breath, sleep disturbances due to breathing and snoring.    Endocrine: Negative for cold intolerance and heat intolerance.   Hematologic/Lymphatic: Does not bruise/bleed easily.   Skin: Negative for itching, poor wound healing and skin cancer.   Musculoskeletal: Negative for arthritis, back pain, joint pain and joint swelling.   Gastrointestinal: Negative for abdominal pain, change in bowel habit, bowel incontinence and constipation.   Genitourinary:  Negative for bladder incontinence, dysuria and hematuria.   Neurological: Negative for brief paralysis, excessive daytime sleepiness, dizziness, focal weakness, headaches, light-headedness and weakness.   Psychiatric/Behavioral: Negative for altered mental status and hallucinations. The patient does not have insomnia.    Allergic/Immunologic: Negative for HIV exposure and persistent infections.   All other systems reviewed and are negative.      Vitals:    06/14/21 0904   BP: 160/80   Pulse: 80   Temp: 97.3 °F (36.3 °C)   SpO2: 98%     Body mass index is 28.35 kg/m².    Physical Exam  Exam conducted with a chaperone present.   Constitutional:       General: She is not in acute distress.     Appearance: She is well-developed.   HENT:      Head: Normocephalic and atraumatic.      Nose: Nose normal.   Eyes:      Conjunctiva/sclera: Conjunctivae normal.      Pupils: Pupils are equal, round, and reactive to light.   Neck:      Trachea: No tracheal deviation.   Pulmonary:      Effort: Pulmonary effort is normal. No respiratory distress.      Breath sounds: Normal breath sounds.   Abdominal:      General: Bowel sounds are normal. There is no distension.      Palpations: Abdomen is soft.   Genitourinary:     Comments: Perianal exam: External skin tag noted to the right posterior with a small area of leukoplakia. No drainage or erythema.       Musculoskeletal:         General: No deformity. Normal range of motion.      Cervical back: Normal range of motion.   Skin:     General: Skin is warm and dry.   Neurological:      Mental Status: She is alert and oriented to person, place, and time.      Cranial Nerves: No cranial nerve deficit.      Coordination: Coordination normal.      Gait: Gait normal.   Psychiatric:         Behavior: Behavior normal.         Judgment: Judgment normal.       Review of Medical Record:  I reviewed colonoscopy 04/21/2006  - Internal Hemorrhoids otherwise WNL    Assessment:  1. Screening for malignant  neoplasm of colon    2. Skin tag      Plan:  - Will schedule a colonoscopy with skin tag removal.   I described with patient typical surgical time, postop recovery including pain management, and restrictions. I discussed with patient risks, benefits, and alternatives.  The patient had opportunity to ask questions.  I answered all questions.  Patient understands and wishes to proceed with procedure.        Scribed for Hellen Taylor MD by Manda Ramos PA-C. 6/14/2021  09:24 EDT   This patient was evaluated by me, recommendations made, documentation reviewed, edited, and revised by me, Hellen Taylor MD

## 2021-07-29 ENCOUNTER — ANESTHESIA EVENT (OUTPATIENT)
Dept: GASTROENTEROLOGY | Facility: HOSPITAL | Age: 46
End: 2021-07-29

## 2021-07-29 ENCOUNTER — ANESTHESIA (OUTPATIENT)
Dept: GASTROENTEROLOGY | Facility: HOSPITAL | Age: 46
End: 2021-07-29

## 2021-07-29 ENCOUNTER — HOSPITAL ENCOUNTER (OUTPATIENT)
Facility: HOSPITAL | Age: 46
Setting detail: HOSPITAL OUTPATIENT SURGERY
Discharge: HOME | End: 2021-07-29
Attending: COLON & RECTAL SURGERY | Admitting: COLON & RECTAL SURGERY

## 2021-07-29 VITALS
HEART RATE: 106 BPM | WEIGHT: 190.3 LBS | DIASTOLIC BLOOD PRESSURE: 49 MMHG | OXYGEN SATURATION: 92 % | SYSTOLIC BLOOD PRESSURE: 99 MMHG | BODY MASS INDEX: 28.19 KG/M2 | HEIGHT: 69 IN | RESPIRATION RATE: 16 BRPM

## 2021-07-29 DIAGNOSIS — Z12.11 SCREENING FOR MALIGNANT NEOPLASM OF COLON: ICD-10-CM

## 2021-07-29 DIAGNOSIS — L91.8 SKIN TAG: ICD-10-CM

## 2021-07-29 PROCEDURE — 25010000002 KETOROLAC TROMETHAMINE PER 15 MG: Performed by: NURSE ANESTHETIST, CERTIFIED REGISTERED

## 2021-07-29 PROCEDURE — 45385 COLONOSCOPY W/LESION REMOVAL: CPT | Performed by: COLON & RECTAL SURGERY

## 2021-07-29 PROCEDURE — 88304 TISSUE EXAM BY PATHOLOGIST: CPT | Performed by: COLON & RECTAL SURGERY

## 2021-07-29 PROCEDURE — 88305 TISSUE EXAM BY PATHOLOGIST: CPT | Performed by: COLON & RECTAL SURGERY

## 2021-07-29 PROCEDURE — 25010000002 PROPOFOL 10 MG/ML EMULSION: Performed by: NURSE ANESTHETIST, CERTIFIED REGISTERED

## 2021-07-29 RX ORDER — SODIUM CHLORIDE 0.9 % (FLUSH) 0.9 %
3 SYRINGE (ML) INJECTION EVERY 12 HOURS SCHEDULED
Status: DISCONTINUED | OUTPATIENT
Start: 2021-07-29 | End: 2021-07-29 | Stop reason: HOSPADM

## 2021-07-29 RX ORDER — PROPOFOL 10 MG/ML
VIAL (ML) INTRAVENOUS AS NEEDED
Status: DISCONTINUED | OUTPATIENT
Start: 2021-07-29 | End: 2021-07-29 | Stop reason: SURG

## 2021-07-29 RX ORDER — SODIUM CHLORIDE 0.9 % (FLUSH) 0.9 %
10 SYRINGE (ML) INJECTION AS NEEDED
Status: DISCONTINUED | OUTPATIENT
Start: 2021-07-29 | End: 2021-07-29 | Stop reason: HOSPADM

## 2021-07-29 RX ORDER — KETOROLAC TROMETHAMINE 30 MG/ML
INJECTION, SOLUTION INTRAMUSCULAR; INTRAVENOUS AS NEEDED
Status: DISCONTINUED | OUTPATIENT
Start: 2021-07-29 | End: 2021-07-29 | Stop reason: SURG

## 2021-07-29 RX ORDER — SODIUM CHLORIDE, SODIUM LACTATE, POTASSIUM CHLORIDE, CALCIUM CHLORIDE 600; 310; 30; 20 MG/100ML; MG/100ML; MG/100ML; MG/100ML
30 INJECTION, SOLUTION INTRAVENOUS CONTINUOUS PRN
Status: DISCONTINUED | OUTPATIENT
Start: 2021-07-29 | End: 2021-07-29 | Stop reason: HOSPADM

## 2021-07-29 RX ORDER — PROPOFOL 10 MG/ML
VIAL (ML) INTRAVENOUS CONTINUOUS PRN
Status: DISCONTINUED | OUTPATIENT
Start: 2021-07-29 | End: 2021-07-29 | Stop reason: SURG

## 2021-07-29 RX ORDER — BUPIVACAINE HYDROCHLORIDE 5 MG/ML
INJECTION, SOLUTION EPIDURAL; INTRACAUDAL AS NEEDED
Status: DISCONTINUED | OUTPATIENT
Start: 2021-07-29 | End: 2021-07-29 | Stop reason: HOSPADM

## 2021-07-29 RX ORDER — SODIUM CHLORIDE, SODIUM LACTATE, POTASSIUM CHLORIDE, CALCIUM CHLORIDE 600; 310; 30; 20 MG/100ML; MG/100ML; MG/100ML; MG/100ML
30 INJECTION, SOLUTION INTRAVENOUS CONTINUOUS
Status: DISCONTINUED | OUTPATIENT
Start: 2021-07-29 | End: 2021-07-29 | Stop reason: HOSPADM

## 2021-07-29 RX ORDER — LIDOCAINE HYDROCHLORIDE 20 MG/ML
INJECTION, SOLUTION INFILTRATION; PERINEURAL AS NEEDED
Status: DISCONTINUED | OUTPATIENT
Start: 2021-07-29 | End: 2021-07-29 | Stop reason: SURG

## 2021-07-29 RX ADMIN — LIDOCAINE HYDROCHLORIDE 60 MG: 20 INJECTION, SOLUTION INFILTRATION; PERINEURAL at 13:33

## 2021-07-29 RX ADMIN — KETOROLAC TROMETHAMINE 30 MG: 30 INJECTION, SOLUTION INTRAMUSCULAR; INTRAVENOUS at 13:50

## 2021-07-29 RX ADMIN — Medication 200 MCG/KG/MIN: at 13:34

## 2021-07-29 RX ADMIN — SODIUM CHLORIDE, POTASSIUM CHLORIDE, SODIUM LACTATE AND CALCIUM CHLORIDE 30 ML/HR: 600; 310; 30; 20 INJECTION, SOLUTION INTRAVENOUS at 13:27

## 2021-07-29 RX ADMIN — PROPOFOL 80 MG: 10 INJECTION, EMULSION INTRAVENOUS at 13:33

## 2021-07-29 NOTE — ANESTHESIA POSTPROCEDURE EVALUATION
"Patient: Marlene Shepherd    Procedure Summary     Date: 07/29/21 Room / Location: [facility] ENDOSCOPY 9 / [facility] ENDOSCOPY    Anesthesia Start: 1329 Anesthesia Stop: 1358    Procedure: COLONOSCOPY TO CECUM WITH SKIN TAG REMOVAL (N/A ) Diagnosis:       Screening for malignant neoplasm of colon      Skin tag      (Screening for malignant neoplasm of colon [Z12.11])      (Skin tag [L91.8])    Surgeons: Hellen Taylor MD Provider: Chalino Diaz MD    Anesthesia Type: MAC ASA Status: 2          Anesthesia Type: MAC    Vitals  Vitals Value Taken Time   BP 99/49 07/29/21 1358   Temp     Pulse 94 07/29/21 1358   Resp 16 07/29/21 1358   SpO2 92 % 07/29/21 1358           Post Anesthesia Care and Evaluation    Patient location during evaluation: bedside  Patient participation: complete - patient participated  Level of consciousness: awake and alert  Pain management: adequate  Airway patency: patent  Anesthetic complications: No anesthetic complications    Cardiovascular status: acceptable  Respiratory status: acceptable  Hydration status: acceptable    Comments: BP 99/49 (BP Location: Left arm, Patient Position: Lying)   Pulse 94   Resp 16   Ht 175.3 cm (69\")   Wt 86.3 kg (190 lb 4.8 oz)   SpO2 92%   BMI 28.10 kg/m²       "

## 2021-07-29 NOTE — ANESTHESIA PREPROCEDURE EVALUATION
Anesthesia Evaluation     Patient summary reviewed and Nursing notes reviewed   no history of anesthetic complications:  NPO Solid Status: > 8 hours  NPO Liquid Status: > 4 hours           Airway   Mallampati: II  Dental      Pulmonary - normal exam   (+) a smoker Former,   Cardiovascular - normal exam    (+) hyperlipidemia,       Neuro/Psych  (+) headaches, psychiatric history Anxiety,     GI/Hepatic/Renal/Endo - negative ROS     Musculoskeletal     Abdominal    Substance History      OB/GYN          Other                        Anesthesia Plan    ASA 2     MAC     intravenous induction     Anesthetic plan, all risks, benefits, and alternatives have been provided, discussed and informed consent has been obtained with: patient.

## 2021-07-30 LAB
LAB AP CASE REPORT: NORMAL
PATH REPORT.FINAL DX SPEC: NORMAL
PATH REPORT.GROSS SPEC: NORMAL

## 2021-09-08 ENCOUNTER — DOCUMENTATION (OUTPATIENT)
Dept: SURGERY | Facility: CLINIC | Age: 46
End: 2021-09-08

## 2021-09-08 PROBLEM — J10.1 INFLUENZA A: Status: ACTIVE | Noted: 2018-02-08

## 2021-09-08 PROBLEM — K64.4 ANAL SKIN TAG: Status: ACTIVE | Noted: 2021-03-01

## 2021-09-08 PROBLEM — J38.5 LARYNGEAL SPASM: Status: ACTIVE | Noted: 2018-10-01
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
## 2021-09-08 NOTE — PROGRESS NOTES
Letter sent to patient via [employer] in regards to her colonoscopy from 07/29/2021 with pathology report.     Recall added; rescope in 3 years.    Thank you.

## 2021-11-06 ENCOUNTER — IMMUNIZATION (OUTPATIENT)
Dept: VACCINE CLINIC | Facility: HOSPITAL | Age: 46
End: 2021-11-06

## 2021-11-06 PROCEDURE — 0004A ADM SARSCOV2 30MCG/0.3ML BOOSTER: CPT | Performed by: INTERNAL MEDICINE

## 2021-11-06 PROCEDURE — 91300 HC SARSCOV02 VAC 30MCG/0.3ML IM: CPT | Performed by: INTERNAL MEDICINE

## 2022-03-21 ENCOUNTER — APPOINTMENT (OUTPATIENT)
Dept: WOMENS IMAGING | Facility: HOSPITAL | Age: 47
End: 2022-03-21

## 2022-03-21 PROCEDURE — 77063 BREAST TOMOSYNTHESIS BI: CPT | Performed by: RADIOLOGY

## 2022-03-21 PROCEDURE — 77067 SCR MAMMO BI INCL CAD: CPT | Performed by: RADIOLOGY

## 2022-03-30 DIAGNOSIS — Z11.59 NEED FOR HEPATITIS C SCREENING TEST: ICD-10-CM

## 2022-03-30 DIAGNOSIS — E55.9 VITAMIN D DEFICIENCY: ICD-10-CM

## 2022-03-30 DIAGNOSIS — E78.5 HYPERLIPIDEMIA, UNSPECIFIED HYPERLIPIDEMIA TYPE: Primary | ICD-10-CM

## 2022-04-26 ENCOUNTER — OFFICE VISIT (OUTPATIENT)
Dept: FAMILY MEDICINE CLINIC | Facility: CLINIC | Age: 47
End: 2022-04-26

## 2022-04-26 VITALS
DIASTOLIC BLOOD PRESSURE: 80 MMHG | HEART RATE: 97 BPM | OXYGEN SATURATION: 99 % | RESPIRATION RATE: 18 BRPM | HEIGHT: 69 IN | WEIGHT: 196.2 LBS | TEMPERATURE: 97.1 F | BODY MASS INDEX: 29.06 KG/M2 | SYSTOLIC BLOOD PRESSURE: 130 MMHG

## 2022-04-26 DIAGNOSIS — E55.9 VITAMIN D DEFICIENCY: ICD-10-CM

## 2022-04-26 DIAGNOSIS — K21.9 GASTROESOPHAGEAL REFLUX DISEASE, UNSPECIFIED WHETHER ESOPHAGITIS PRESENT: ICD-10-CM

## 2022-04-26 DIAGNOSIS — E78.5 HYPERLIPIDEMIA, UNSPECIFIED HYPERLIPIDEMIA TYPE: ICD-10-CM

## 2022-04-26 DIAGNOSIS — F41.1 ANXIETY, GENERALIZED: ICD-10-CM

## 2022-04-26 DIAGNOSIS — Z00.00 HEALTH MAINTENANCE EXAMINATION: Primary | ICD-10-CM

## 2022-04-26 PROCEDURE — 99396 PREV VISIT EST AGE 40-64: CPT | Performed by: INTERNAL MEDICINE

## 2022-04-26 RX ORDER — ATORVASTATIN CALCIUM 20 MG/1
20 TABLET, FILM COATED ORAL DAILY
Qty: 90 TABLET | Refills: 3 | Status: SHIPPED | OUTPATIENT
Start: 2022-04-26

## 2022-04-26 RX ORDER — OMEPRAZOLE 40 MG/1
40 CAPSULE, DELAYED RELEASE ORAL DAILY
Qty: 90 CAPSULE | Refills: 3 | Status: SHIPPED | OUTPATIENT
Start: 2022-04-26

## 2022-04-26 RX ORDER — FLUOXETINE HYDROCHLORIDE 40 MG/1
40 CAPSULE ORAL DAILY
Qty: 90 CAPSULE | Refills: 3 | Status: SHIPPED | OUTPATIENT
Start: 2022-04-26

## 2022-04-26 RX ORDER — OMEPRAZOLE 20 MG/1
40 CAPSULE, DELAYED RELEASE ORAL DAILY
Qty: 90 CAPSULE | Refills: 3 | Status: SHIPPED | OUTPATIENT
Start: 2022-04-26 | End: 2022-04-26 | Stop reason: SDUPTHER

## 2022-04-26 NOTE — PROGRESS NOTES
Subjective   Marlene Shepherd is a 46 y.o. female. Patient is here today for a complete physical exam.  She has no acute complaints.  PMH-history of hyperlipidemia, mild anxiety, vitamin D deficiency and a hysterectomy in .  SH-patient's , sexually active.  She has 1 to 2 glasses of wine per night but does not use tobacco products.  She is up-to-date with gynecology, mammograms and Pap smears and has regular dental and vision checks.  She had a colonoscopy in  with 1 adenomatous polyp by Dr. Yary Bains.  FH-patient's father is living and healthy.  Mother  in her 60s of alcoholism and sepsis.  Chief Complaint   Patient presents with   • Annual Exam     PT HERE FOR CPE          Vitals:    22 1530   BP: 130/80   Pulse: 97   Resp: 18   Temp: 97.1 °F (36.2 °C)   SpO2: 99%     Body mass index is 28.96 kg/m².    The following portions of the patient's history were reviewed and updated as appropriate: allergies, current medications, past family history, past medical history, past social history, past surgical history and problem list.    Past Medical History:   Diagnosis Date   • Abnormal uterine bleeding (AUB) 2015   • Allergic rhinitis    • Anal skin tag 2021    S/P EXCISION   • Anxiety    • Colon polyps     FOLLOWED BY DR. DAVE BAINS   • Decreased libido    • Endometrial mass 2015   • Fall 2020    CONTUSION OF RIGHT BACK WALL OF THORAX, MUSCLE STRAIN   • Hemorrhoids    • Hyperlipidemia    • Idiopathic scoliosis of thoracic spine 2018    Dextroscoliosis of the thoracic spine   • Infertility, female    • Influenza A 2018   • Laryngeal spasm 10/2018   • Migraines    • Recurrent cold sores 2018   • SAB (spontaneous ) 2013     A1   • Uterine fibroid    • Vitamin D deficiency    • Vulvodynia       Allergies   Allergen Reactions   • Latex Rash      Social History     Socioeconomic History   • Marital status:    Tobacco Use   • Smoking status:  Former Smoker     Packs/day: 1.00     Types: Cigarettes     Quit date:      Years since quittin.3   • Smokeless tobacco: Never Used   • Tobacco comment: quit    Vaping Use   • Vaping Use: Never used   Substance and Sexual Activity   • Alcohol use: Yes     Comment: 5 aweek   • Drug use: Never   • Sexual activity: Defer     Birth control/protection: None, Surgical        Current Outpatient Medications:   •  FLUoxetine (PROzac) 40 MG capsule, Take 1 capsule by mouth Daily., Disp: 90 capsule, Rfl: 3  •  omeprazole (priLOSEC) 40 MG capsule, Take 1 capsule by mouth Daily., Disp: 90 capsule, Rfl: 3  •  atorvastatin (LIPITOR) 20 MG tablet, Take 1 tablet by mouth Daily., Disp: 90 tablet, Rfl: 3     EKG  ECG Report    Objective   History of Present Illness   Marlene Shepherd 46 y.o. female who presents for an Annual Wellness Visit.  she has a history of   Patient Active Problem List   Diagnosis   • Anxiety, generalized   • HLD (hyperlipidemia)   • Health maintenance examination   • Family history of colonic polyps   • Idiopathic scoliosis of thoracic spine   • Recurrent cold sores   • Screening for malignant neoplasm of colon   • Skin tag   • Vulvodynia   • Vitamin D deficiency   • Uterine fibroid   • SAB (spontaneous )   • Laryngeal spasm   • Influenza A   • Infertility, female   • Hyperlipidemia   • Hemorrhoids   • Fall   • Endometrial mass   • Decreased libido   • Colon polyps   • Anxiety   • Anal skin tag   • Allergic rhinitis   • Abnormal uterine bleeding (AUB)   • GERD (gastroesophageal reflux disease)   .  she has been doing well with new interval problems.  Labs results discussed in detail with the patient.  Plan to update vaccines if needed today.    Health Habits:  Dental Exam. up to date  Eye Exam. up to date  Exercise: 0 times/week.  Current exercise activities include: none      Lab Results (most recent)     None            Review of Systems   All other systems reviewed and are  negative.      Physical Exam  Vitals and nursing note reviewed.   Constitutional:       General: She is not in acute distress.     Appearance: Normal appearance. She is not ill-appearing.   HENT:      Head: Normocephalic and atraumatic.      Right Ear: Tympanic membrane, ear canal and external ear normal. There is no impacted cerumen.      Left Ear: Tympanic membrane, ear canal and external ear normal.   Eyes:      General: No scleral icterus.     Conjunctiva/sclera: Conjunctivae normal.   Neck:      Vascular: No carotid bruit.   Cardiovascular:      Rate and Rhythm: Normal rate and regular rhythm.      Heart sounds: Normal heart sounds.   Pulmonary:      Effort: Pulmonary effort is normal. No respiratory distress.      Breath sounds: Normal breath sounds. No wheezing or rales.   Abdominal:      General: Abdomen is flat. Bowel sounds are normal. There is no distension.      Palpations: Abdomen is soft. There is no mass.      Tenderness: There is no abdominal tenderness. There is no right CVA tenderness, left CVA tenderness, guarding or rebound.      Hernia: No hernia is present.   Musculoskeletal:         General: Normal range of motion.      Cervical back: Normal range of motion and neck supple. No rigidity or tenderness.   Lymphadenopathy:      Cervical: No cervical adenopathy.   Skin:     General: Skin is warm and dry.   Neurological:      General: No focal deficit present.      Mental Status: She is oriented to person, place, and time.   Psychiatric:         Mood and Affect: Mood normal.         Behavior: Behavior normal.         Thought Content: Thought content normal.         Judgment: Judgment normal.         ASSESSMENT CBC is normal.  CMP has a sugar of 101 and is otherwise normal.  Lipid panel has total cholesterol 194, HDL 49, .  TSH was 3.62 and vitamin D level was low at 18.3.  Hepatitis C screen was negative.  #1-hyperlipidemia, not optimally controlled on 10 mg of atorvastatin  #2-vitamin D  deficiency  #3-GERD, not controlled on over-the-counter Prilosec  #4-anxiety controlled on medication         Problems Addressed this Visit        Cardiac and Vasculature    HLD (hyperlipidemia)    Relevant Medications    atorvastatin (LIPITOR) 20 MG tablet       Endocrine and Metabolic    Vitamin D deficiency       Gastrointestinal Abdominal     GERD (gastroesophageal reflux disease)    Relevant Medications    omeprazole (priLOSEC) 40 MG capsule    Other Relevant Orders    Ambulatory Referral to Gastroenterology       Health Encounters    Health maintenance examination - Primary       Mental Health    Anxiety, generalized    Relevant Medications    FLUoxetine (PROzac) 40 MG capsule      Diagnoses       Codes Comments    Health maintenance examination    -  Primary ICD-10-CM: Z00.00  ICD-9-CM: V70.0     Hyperlipidemia, unspecified hyperlipidemia type     ICD-10-CM: E78.5  ICD-9-CM: 272.4     Vitamin D deficiency     ICD-10-CM: E55.9  ICD-9-CM: 268.9     Anxiety, generalized     ICD-10-CM: F41.1  ICD-9-CM: 300.02     Gastroesophageal reflux disease, unspecified whether esophagitis present     ICD-10-CM: K21.9  ICD-9-CM: 530.81           PLAN I encouraged the patient to start exercising and I am increasing her atorvastatin to 20 mg daily.  I am also increasing the omeprazole to 40 mg daily and I am referring her to gastroenterology because of the severe reflux.  She needs to take calcium and vitamin D regularly.  I plan on rechecking her in 6 months with laboratory studies    There are no Patient Instructions on file for this visit.    No follow-ups on file.

## 2022-05-24 ENCOUNTER — OFFICE VISIT (OUTPATIENT)
Dept: GASTROENTEROLOGY | Facility: CLINIC | Age: 47
End: 2022-05-24

## 2022-05-24 VITALS
TEMPERATURE: 97.8 F | DIASTOLIC BLOOD PRESSURE: 87 MMHG | BODY MASS INDEX: 27.49 KG/M2 | SYSTOLIC BLOOD PRESSURE: 124 MMHG | WEIGHT: 185.6 LBS | HEIGHT: 69 IN | OXYGEN SATURATION: 98 % | HEART RATE: 73 BPM

## 2022-05-24 DIAGNOSIS — R11.10 REGURGITATION OF FOOD: ICD-10-CM

## 2022-05-24 DIAGNOSIS — K21.9 GASTROESOPHAGEAL REFLUX DISEASE, UNSPECIFIED WHETHER ESOPHAGITIS PRESENT: Primary | ICD-10-CM

## 2022-05-24 DIAGNOSIS — D12.6 ADENOMATOUS POLYP OF COLON, UNSPECIFIED PART OF COLON: ICD-10-CM

## 2022-05-24 PROCEDURE — 99203 OFFICE O/P NEW LOW 30 MIN: CPT | Performed by: INTERNAL MEDICINE

## 2022-05-24 RX ORDER — SODIUM CHLORIDE, SODIUM LACTATE, POTASSIUM CHLORIDE, CALCIUM CHLORIDE 600; 310; 30; 20 MG/100ML; MG/100ML; MG/100ML; MG/100ML
30 INJECTION, SOLUTION INTRAVENOUS CONTINUOUS
Status: CANCELLED | OUTPATIENT
Start: 2022-05-24

## 2022-05-24 NOTE — PROGRESS NOTES
Chief Complaint   Patient presents with   • Heartburn   • Regurgitation        Marlene Shepherd is a  47 y.o. female here for an initial visit for GERD, regurgitation, history of polyps    HPI this 47-year-old white female patient of Dr. Shawn Romero presents with a history of reflux over the past 2 years but has had significant exacerbation over the past 6 months with bouts of regurgitation as well as vomiting.  She has adjusted dietary intake as far as fatty foods and also stopped drinking alcohol which has shown dramatic improvement of her symptoms.  She was told to increase her omeprazole to 40 mg daily as well.  Her weight has been stable.  She has had previous lower GI assessment by the colorectal service with a known history of polyps and hemorrhoids.  I offered upper endoscopic evaluation to better assess her condition and she is amenable to this.    Past Medical History:   Diagnosis Date   • Abnormal uterine bleeding (AUB) 2015   • Allergic rhinitis    • Anal skin tag 2021    S/P EXCISION   • Anxiety    • Colon polyps     FOLLOWED BY DR. DAVE BAINS   • Decreased libido    • Endometrial mass 2015   • Fall 2020    CONTUSION OF RIGHT BACK WALL OF THORAX, MUSCLE STRAIN   • Hemorrhoids    • Hyperlipidemia    • Idiopathic scoliosis of thoracic spine 2018    Dextroscoliosis of the thoracic spine   • Infertility, female    • Influenza A 2018   • Laryngeal spasm 10/2018   • Migraines    • Recurrent cold sores 2018   • SAB (spontaneous ) 2013     A1   • Uterine fibroid    • Vitamin D deficiency    • Vulvodynia        Current Outpatient Medications   Medication Sig Dispense Refill   • atorvastatin (LIPITOR) 20 MG tablet Take 1 tablet by mouth Daily. 90 tablet 3   • FLUoxetine (PROzac) 40 MG capsule Take 1 capsule by mouth Daily. 90 capsule 3   • omeprazole (priLOSEC) 40 MG capsule Take 1 capsule by mouth Daily. 90 capsule 3     No current facility-administered  medications for this visit.       PRN Meds:.    Allergies   Allergen Reactions   • Latex Rash       Social History     Socioeconomic History   • Marital status:    Tobacco Use   • Smoking status: Former Smoker     Packs/day: 1.00     Types: Cigarettes     Quit date:      Years since quittin.4   • Smokeless tobacco: Never Used   • Tobacco comment: quit    Vaping Use   • Vaping Use: Never used   Substance and Sexual Activity   • Alcohol use: Not Currently     Comment: 5 aweek   • Drug use: Never   • Sexual activity: Defer     Birth control/protection: None, Surgical       Family History   Problem Relation Age of Onset   • Depression Other    • Gout Other    • Hyperlipidemia Other    • Colon cancer Other    • Diabetes Other    • Colon polyps Other    • Cancer Maternal Grandmother    • Kidney disease Maternal Grandmother    • Brain cancer Paternal Grandmother    • Cancer Paternal Grandmother    • COPD Paternal Grandfather    • Alcohol abuse Mother    • Hypertension Mother    • Other Mother         SEPSIS   • Alcohol abuse Father    • Hypertension Father    • Colon polyps Father    • Hypertension Brother    • Alcohol abuse Maternal Grandfather    • Cancer Maternal Grandfather    • Kidney cancer Maternal Grandfather        Review of Systems   Constitutional: Negative for activity change, appetite change, fatigue and unexpected weight change.   HENT: Negative for congestion, facial swelling, sore throat, trouble swallowing and voice change.    Eyes: Negative for photophobia and visual disturbance.   Respiratory: Negative for cough and choking.    Cardiovascular: Negative for chest pain.   Gastrointestinal: Negative for abdominal distention, abdominal pain, anal bleeding, blood in stool, constipation, diarrhea, nausea, rectal pain and vomiting.        GERD  Regurgitation   Endocrine: Negative for polyphagia.   Musculoskeletal: Negative for arthralgias, gait problem and joint swelling.   Skin: Negative  for color change, pallor and rash.   Allergic/Immunologic: Negative for food allergies.   Neurological: Negative for speech difficulty and headaches.   Hematological: Does not bruise/bleed easily.   Psychiatric/Behavioral: Negative for agitation, confusion and sleep disturbance.       Vitals:    05/24/22 1424   BP: 124/87   Pulse: 73   Temp: 97.8 °F (36.6 °C)   SpO2: 98%       Physical Exam  Vitals reviewed.   Constitutional:       General: She is not in acute distress.     Appearance: She is well-developed. She is not diaphoretic.   HENT:      Head: Normocephalic.      Mouth/Throat:      Pharynx: No oropharyngeal exudate.   Eyes:      General: No scleral icterus.     Conjunctiva/sclera: Conjunctivae normal.   Neck:      Thyroid: No thyromegaly.   Cardiovascular:      Rate and Rhythm: Normal rate and regular rhythm.      Heart sounds: No murmur heard.  Pulmonary:      Effort: No respiratory distress.      Breath sounds: Normal breath sounds. No wheezing or rales.   Abdominal:      General: Bowel sounds are normal. There is no distension.      Palpations: Abdomen is soft. There is no mass.      Tenderness: There is no abdominal tenderness.   Musculoskeletal:         General: No tenderness. Normal range of motion.      Cervical back: Normal range of motion.   Lymphadenopathy:      Cervical: No cervical adenopathy.   Skin:     General: Skin is warm and dry.      Findings: No erythema or rash.   Neurological:      Mental Status: She is alert and oriented to person, place, and time.   Psychiatric:         Behavior: Behavior normal.         ASSESSMENT   #1 GERD: Better with dietary adjustment and PPI  #2 regurgitation  #3 history of polyps      PLAN  Schedule EGD  Continue PPI  Further recommendations to follow endoscopic assessment      ICD-10-CM ICD-9-CM   1. Gastroesophageal reflux disease, unspecified whether esophagitis present  K21.9 530.81   2. Regurgitation of food  R11.10 787.03   3. Adenomatous polyp of colon,  unspecified part of colon  D12.6 211.3

## 2022-07-18 ENCOUNTER — OUTSIDE FACILITY SERVICE (OUTPATIENT)
Dept: GASTROENTEROLOGY | Facility: CLINIC | Age: 47
End: 2022-07-18

## 2022-07-18 PROCEDURE — 43239 EGD BIOPSY SINGLE/MULTIPLE: CPT | Performed by: INTERNAL MEDICINE

## 2022-07-28 ENCOUNTER — TELEPHONE (OUTPATIENT)
Dept: GASTROENTEROLOGY | Facility: CLINIC | Age: 47
End: 2022-07-28

## 2022-07-28 NOTE — TELEPHONE ENCOUNTER
----- Message from Shan GONZÁLES MD sent at 7/24/2022 12:22 PM EDT -----  Regarding: Biopsy results  Okay to call results, recommend either continue current PPI or switch to alternative PPI.  If regurgitation persists, can offer further evaluation i.e. gastric emptying study, esophageal manometry.  ----- Message -----  From: Interface, Scans Incoming  Sent: 7/21/2022   3:03 PM EDT  To: Shan GONZÁLES MD

## 2022-08-03 ENCOUNTER — TELEPHONE (OUTPATIENT)
Dept: GASTROENTEROLOGY | Facility: CLINIC | Age: 47
End: 2022-08-03

## 2022-08-03 NOTE — TELEPHONE ENCOUNTER
Caller: Marlene Shepherd    Relationship: Self    Best call back number: [phone]     Caller requesting test results: YES     What test was performed: ENDOSCOPY     When was the test performed:07/18/22     Where was the test performed: [facility]     Additional notes: PATIENT CALLING TO HAVE TEST RESULTS PROVIDED. PT MISSED NURSE CALL AND IS REQUESTING CALL BACK.

## 2023-05-30 RX ORDER — FLUOXETINE HYDROCHLORIDE 40 MG/1
40 CAPSULE ORAL DAILY
Qty: 30 CAPSULE | Refills: 0 | Status: SHIPPED | OUTPATIENT
Start: 2023-05-30

## 2023-05-30 RX ORDER — OMEPRAZOLE 40 MG/1
40 CAPSULE, DELAYED RELEASE ORAL DAILY
Qty: 30 CAPSULE | Refills: 0 | Status: SHIPPED | OUTPATIENT
Start: 2023-05-30

## 2023-05-30 RX ORDER — ATORVASTATIN CALCIUM 20 MG/1
20 TABLET, FILM COATED ORAL DAILY
Qty: 30 TABLET | Refills: 0 | Status: SHIPPED | OUTPATIENT
Start: 2023-05-30

## 2023-07-27 RX ORDER — FLUOXETINE HYDROCHLORIDE 40 MG/1
40 CAPSULE ORAL DAILY
Qty: 30 CAPSULE | Refills: 0 | OUTPATIENT
Start: 2023-07-27

## 2023-07-27 RX ORDER — ATORVASTATIN CALCIUM 20 MG/1
20 TABLET, FILM COATED ORAL DAILY
Qty: 30 TABLET | Refills: 0 | OUTPATIENT
Start: 2023-07-27

## 2023-07-27 RX ORDER — OMEPRAZOLE 40 MG/1
40 CAPSULE, DELAYED RELEASE ORAL DAILY
Qty: 30 CAPSULE | Refills: 0 | OUTPATIENT
Start: 2023-07-27

## 2023-08-09 ENCOUNTER — OFFICE VISIT (OUTPATIENT)
Dept: FAMILY MEDICINE CLINIC | Facility: CLINIC | Age: 48
End: 2023-08-09
Payer: COMMERCIAL

## 2023-08-09 VITALS
HEART RATE: 74 BPM | SYSTOLIC BLOOD PRESSURE: 118 MMHG | TEMPERATURE: 98.3 F | DIASTOLIC BLOOD PRESSURE: 74 MMHG | RESPIRATION RATE: 18 BRPM | HEIGHT: 69 IN | OXYGEN SATURATION: 98 % | BODY MASS INDEX: 26.2 KG/M2 | WEIGHT: 176.9 LBS

## 2023-08-09 DIAGNOSIS — Z00.00 HEALTH MAINTENANCE EXAMINATION: ICD-10-CM

## 2023-08-09 DIAGNOSIS — E55.9 VITAMIN D DEFICIENCY: ICD-10-CM

## 2023-08-09 DIAGNOSIS — E78.5 HYPERLIPIDEMIA, UNSPECIFIED HYPERLIPIDEMIA TYPE: Primary | ICD-10-CM

## 2023-08-09 PROBLEM — M79.604 PAIN IN BOTH LOWER EXTREMITIES: Status: ACTIVE | Noted: 2023-08-09

## 2023-08-09 PROBLEM — M79.605 PAIN IN BOTH LOWER EXTREMITIES: Status: ACTIVE | Noted: 2023-08-09

## 2023-08-09 RX ORDER — ATORVASTATIN CALCIUM 20 MG/1
20 TABLET, FILM COATED ORAL DAILY
Qty: 30 TABLET | Refills: 0 | Status: SHIPPED | OUTPATIENT
Start: 2023-08-09

## 2023-08-09 RX ORDER — FLUOXETINE HYDROCHLORIDE 40 MG/1
40 CAPSULE ORAL DAILY
Qty: 30 CAPSULE | Refills: 0 | Status: SHIPPED | OUTPATIENT
Start: 2023-08-09

## 2023-08-09 RX ORDER — OMEPRAZOLE 40 MG/1
40 CAPSULE, DELAYED RELEASE ORAL DAILY
Qty: 30 CAPSULE | Refills: 0 | Status: SHIPPED | OUTPATIENT
Start: 2023-08-09

## 2023-08-09 NOTE — PROGRESS NOTES
Subjective   Marlene Shepherd is a 48 y.o. female. Patient is here today for a complete physical exam.  She generally feels well.  Her only complaint is of some diffuse aching in her legs at night  PMH-status post vitamin D deficiency and hysterectomy in .  Also had colon polyps.  SH-, no alcohol use and no tobacco use.  She is up-to-date with gynecology mammograms and Pap smears and has regular and dental and vision checks.  Colonoscopy was in  by Dr. Bains and will be due next year.  FH-patient's father is living and healthy.  Mother  in her 60s of alcoholism and sepsis.    Chief Complaint   Patient presents with    Hyperlipidemia          Vitals:    23 0940   BP: 118/74   Pulse: 74   Resp: 18   Temp: 98.3 øF (36.8 øC)   SpO2: 98%     Body mass index is 26.12 kg/mý.    The following portions of the patient's history were reviewed and updated as appropriate: allergies, current medications, past family history, past medical history, past social history, past surgical history and problem list.    Past Medical History:   Diagnosis Date    Abnormal uterine bleeding (AUB) 2015    Allergic rhinitis     Anal skin tag 2021    S/P EXCISION    Anxiety     Colon polyps     FOLLOWED BY DR. DAVE BAINS    Decreased libido     Endometrial mass 2015    Fall 2020    CONTUSION OF RIGHT BACK WALL OF THORAX, MUSCLE STRAIN    Hemorrhoids     Hyperlipidemia     Idiopathic scoliosis of thoracic spine 2018    Dextroscoliosis of the thoracic spine    Infertility, female     Influenza A 2018    Laryngeal spasm 10/2018    Migraines     Recurrent cold sores 2018    SAB (spontaneous ) 2013     A1    Uterine fibroid     Vitamin D deficiency     Vulvodynia       Allergies   Allergen Reactions    Latex Rash      Social History     Socioeconomic History    Marital status:    Tobacco Use    Smoking status: Former     Packs/day: 1.00     Years: 15.00     Pack years:  15.00     Types: Cigarettes     Start date: 1993     Quit date: 2008     Years since quitting: 15.6     Passive exposure: Past    Smokeless tobacco: Never    Tobacco comments:     quit 2008   Vaping Use    Vaping Use: Never used   Substance and Sexual Activity    Alcohol use: Not Currently     Comment: 5 aweek    Drug use: Never    Sexual activity: Defer     Birth control/protection: None, Surgical        Current Outpatient Medications:     atorvastatin (LIPITOR) 20 MG tablet, Take 1 tablet by mouth Daily., Disp: 30 tablet, Rfl: 0    FLUoxetine (PROzac) 40 MG capsule, Take 1 capsule by mouth Daily., Disp: 30 capsule, Rfl: 0    omeprazole (priLOSEC) 40 MG capsule, Take 1 capsule by mouth Daily., Disp: 30 capsule, Rfl: 0     EKG Procedures    ECG Report    Objective   History of Present Illness   Marlene Shepherd 48 y.o. female who presents for an Annual physical exam.   Labs results discussed in detail with the patient.  Plan to update vaccines if needed today.    Health Habits:  Dental Exam. up to date  Eye Exam. up to date  Exercise:  Not asked  times/week.  Current exercise activities include: not asked    Preventative counseling  Discussed face to face the importance of healthy diet and exercise.     Lab Results (most recent)       None              Review of Systems    Physical Exam  Vitals and nursing note reviewed.   Constitutional:       General: She is not in acute distress.     Appearance: Normal appearance. She is normal weight. She is not ill-appearing.   HENT:      Head: Normocephalic and atraumatic.   Neck:      Vascular: No carotid bruit.   Cardiovascular:      Rate and Rhythm: Normal rate and regular rhythm.      Heart sounds: Normal heart sounds.   Pulmonary:      Effort: Pulmonary effort is normal. No respiratory distress.      Breath sounds: Normal breath sounds. No stridor. No wheezing, rhonchi or rales.   Chest:      Chest wall: No tenderness.   Abdominal:      General: Abdomen is flat. Bowel  sounds are normal. There is no distension.      Palpations: Abdomen is soft. There is no mass.      Tenderness: There is no abdominal tenderness. There is no right CVA tenderness, left CVA tenderness, guarding or rebound.      Hernia: No hernia is present.   Musculoskeletal:         General: Normal range of motion.      Cervical back: Normal range of motion and neck supple. No rigidity or tenderness.   Lymphadenopathy:      Cervical: No cervical adenopathy.   Skin:     General: Skin is warm and dry.   Neurological:      General: No focal deficit present.      Mental Status: She is alert and oriented to person, place, and time.   Psychiatric:         Mood and Affect: Mood normal.         Behavior: Behavior normal.         Thought Content: Thought content normal.         Judgment: Judgment normal.       ASSESSMENT laboratory studies are pending.  #1-generally healthy female  #2-history of hyperlipidemia  #3-history of vitamin D deficiency  #4-history of colon polyps  #5-GERD, stable on omeprazole  #6-leg discomfort at night, question restless leg syndrome       Problems Addressed this Visit          Cardiac and Vasculature    Hyperlipidemia - Primary    Relevant Medications    atorvastatin (LIPITOR) 20 MG tablet    Other Relevant Orders    Lipid Panel With LDL / HDL Ratio    Comprehensive Metabolic Panel    Lipid Panel With LDL / HDL Ratio       Endocrine and Metabolic    Vitamin D deficiency    Relevant Orders    Vitamin D,25-Hydroxy       Health Encounters    Health maintenance examination    Relevant Orders    CBC & Differential    Comprehensive Metabolic Panel    Vitamin B12    TSH     Diagnoses         Codes Comments    Hyperlipidemia, unspecified hyperlipidemia type    -  Primary ICD-10-CM: E78.5  ICD-9-CM: 272.4     Vitamin D deficiency     ICD-10-CM: E55.9  ICD-9-CM: 268.9     Health maintenance examination     ICD-10-CM: Z00.00  ICD-9-CM: V70.0             PLAN laboratory studies will be drawn today and  reviewed by me tomorrow.  The patient will try Tylenol, ibuprofen or Aleve as needed for the leg aches at night.  She does not wish to try any of the medicines for restless leg syndrome.  If her labs are okay, she should be fine for just an annual physical with labs.    There are no Patient Instructions on file for this visit.    No follow-ups on file.  Patient was given instructions and counseling regarding her  condition for health maintenance advice.  Please see specific information pulled into the AVS if appropriate.

## 2023-08-10 LAB
25(OH)D3+25(OH)D2 SERPL-MCNC: 24.5 NG/ML (ref 30–100)
ALBUMIN SERPL-MCNC: 4.9 G/DL (ref 3.5–5.2)
ALBUMIN/GLOB SERPL: 2.2 G/DL
ALP SERPL-CCNC: 71 U/L (ref 39–117)
ALT SERPL-CCNC: 45 U/L (ref 1–33)
AST SERPL-CCNC: 57 U/L (ref 1–32)
BASOPHILS # BLD AUTO: 0.04 10*3/MM3 (ref 0–0.2)
BASOPHILS NFR BLD AUTO: 0.8 % (ref 0–1.5)
BILIRUB SERPL-MCNC: 0.5 MG/DL (ref 0–1.2)
BUN SERPL-MCNC: 15 MG/DL (ref 6–20)
BUN/CREAT SERPL: 15.6 (ref 7–25)
CALCIUM SERPL-MCNC: 10.5 MG/DL (ref 8.6–10.5)
CHLORIDE SERPL-SCNC: 103 MMOL/L (ref 98–107)
CHOLEST SERPL-MCNC: 227 MG/DL (ref 0–200)
CO2 SERPL-SCNC: 28 MMOL/L (ref 22–29)
CREAT SERPL-MCNC: 0.96 MG/DL (ref 0.57–1)
EGFRCR SERPLBLD CKD-EPI 2021: 73.1 ML/MIN/1.73
EOSINOPHIL # BLD AUTO: 0.07 10*3/MM3 (ref 0–0.4)
EOSINOPHIL NFR BLD AUTO: 1.4 % (ref 0.3–6.2)
ERYTHROCYTE [DISTWIDTH] IN BLOOD BY AUTOMATED COUNT: 13 % (ref 12.3–15.4)
GLOBULIN SER CALC-MCNC: 2.2 GM/DL
GLUCOSE SERPL-MCNC: 88 MG/DL (ref 65–99)
HCT VFR BLD AUTO: 36.7 % (ref 34–46.6)
HDLC SERPL-MCNC: 60 MG/DL (ref 40–60)
HGB BLD-MCNC: 12.4 G/DL (ref 12–15.9)
IMM GRANULOCYTES # BLD AUTO: 0.01 10*3/MM3 (ref 0–0.05)
IMM GRANULOCYTES NFR BLD AUTO: 0.2 % (ref 0–0.5)
LDLC SERPL CALC-MCNC: 156 MG/DL (ref 0–100)
LDLC/HDLC SERPL: 2.56 {RATIO}
LYMPHOCYTES # BLD AUTO: 1.83 10*3/MM3 (ref 0.7–3.1)
LYMPHOCYTES NFR BLD AUTO: 35.9 % (ref 19.6–45.3)
MCH RBC QN AUTO: 29.7 PG (ref 26.6–33)
MCHC RBC AUTO-ENTMCNC: 33.8 G/DL (ref 31.5–35.7)
MCV RBC AUTO: 87.8 FL (ref 79–97)
MONOCYTES # BLD AUTO: 0.42 10*3/MM3 (ref 0.1–0.9)
MONOCYTES NFR BLD AUTO: 8.2 % (ref 5–12)
NEUTROPHILS # BLD AUTO: 2.73 10*3/MM3 (ref 1.7–7)
NEUTROPHILS NFR BLD AUTO: 53.5 % (ref 42.7–76)
NRBC BLD AUTO-RTO: 0 /100 WBC (ref 0–0.2)
PLATELET # BLD AUTO: 327 10*3/MM3 (ref 140–450)
POTASSIUM SERPL-SCNC: 5.3 MMOL/L (ref 3.5–5.2)
PROT SERPL-MCNC: 7.1 G/DL (ref 6–8.5)
RBC # BLD AUTO: 4.18 10*6/MM3 (ref 3.77–5.28)
SODIUM SERPL-SCNC: 141 MMOL/L (ref 136–145)
TRIGL SERPL-MCNC: 66 MG/DL (ref 0–150)
TSH SERPL DL<=0.005 MIU/L-ACNC: 2.77 UIU/ML (ref 0.27–4.2)
VIT B12 SERPL-MCNC: 273 PG/ML (ref 211–946)
VLDLC SERPL CALC-MCNC: 11 MG/DL (ref 5–40)
WBC # BLD AUTO: 5.1 10*3/MM3 (ref 3.4–10.8)

## 2023-08-22 ENCOUNTER — OFFICE VISIT (OUTPATIENT)
Dept: FAMILY MEDICINE CLINIC | Facility: CLINIC | Age: 48
End: 2023-08-22
Payer: COMMERCIAL

## 2023-08-22 VITALS
HEART RATE: 64 BPM | HEIGHT: 69 IN | DIASTOLIC BLOOD PRESSURE: 80 MMHG | SYSTOLIC BLOOD PRESSURE: 118 MMHG | TEMPERATURE: 97.8 F | RESPIRATION RATE: 16 BRPM | BODY MASS INDEX: 25.73 KG/M2 | WEIGHT: 173.7 LBS | OXYGEN SATURATION: 97 %

## 2023-08-22 DIAGNOSIS — E55.9 VITAMIN D DEFICIENCY: ICD-10-CM

## 2023-08-22 DIAGNOSIS — E78.5 HYPERLIPIDEMIA, UNSPECIFIED HYPERLIPIDEMIA TYPE: Primary | ICD-10-CM

## 2023-08-22 PROCEDURE — 99214 OFFICE O/P EST MOD 30 MIN: CPT | Performed by: INTERNAL MEDICINE

## 2023-08-22 RX ORDER — OMEPRAZOLE 40 MG/1
40 CAPSULE, DELAYED RELEASE ORAL DAILY
Qty: 30 CAPSULE | Refills: 11 | Status: SHIPPED | OUTPATIENT
Start: 2023-08-22 | End: 2023-08-28

## 2023-08-22 RX ORDER — FLUOXETINE HYDROCHLORIDE 40 MG/1
40 CAPSULE ORAL DAILY
Qty: 30 CAPSULE | Refills: 11 | Status: SHIPPED | OUTPATIENT
Start: 2023-08-22 | End: 2023-08-28

## 2023-08-22 RX ORDER — ATORVASTATIN CALCIUM 20 MG/1
20 TABLET, FILM COATED ORAL DAILY
Qty: 30 TABLET | Refills: 11 | Status: SHIPPED | OUTPATIENT
Start: 2023-08-22 | End: 2023-08-28

## 2023-08-22 RX ORDER — MELATONIN
2000 DAILY
COMMUNITY

## 2023-08-22 RX ORDER — UBIDECARENONE 75 MG
100 CAPSULE ORAL DAILY
COMMUNITY

## 2023-08-22 NOTE — PROGRESS NOTES
Subjective   Marlene Shepherd is a 48 y.o. female. Patient is here today for follow-up on laboratory studies.  She feels well and has no acute complaints.  She was off atorvastatin at her last lab draw but has now restarted it.  Chief Complaint   Patient presents with    Hyperlipidemia          Vitals:    23 1410   BP: 118/80   Pulse: 64   Resp: 16   Temp: 97.8 øF (36.6 øC)   SpO2: 97%     Body mass index is 25.64 kg/mý.  The following portions of the patient's history were reviewed and updated as appropriate: allergies, current medications, past family history, past medical history, past social history, past surgical history and problem list.    Past Medical History:   Diagnosis Date    Abnormal uterine bleeding (AUB) 2015    Allergic rhinitis     Anal skin tag 2021    S/P EXCISION    Anxiety     Colon polyps     FOLLOWED BY DR. DAVE BAINS    Decreased libido     Endometrial mass 2015    Fall 2020    CONTUSION OF RIGHT BACK WALL OF THORAX, MUSCLE STRAIN    Hemorrhoids     Hyperlipidemia     Idiopathic scoliosis of thoracic spine 2018    Dextroscoliosis of the thoracic spine    Infertility, female     Influenza A 2018    Laryngeal spasm 10/2018    Migraines     Recurrent cold sores 2018    SAB (spontaneous ) 2013     A1    Uterine fibroid     Vitamin D deficiency     Vulvodynia       Allergies   Allergen Reactions    Latex Rash      Social History     Socioeconomic History    Marital status:    Tobacco Use    Smoking status: Former     Packs/day: 1.00     Years: 15.00     Pack years: 15.00     Types: Cigarettes     Start date:      Quit date: 2008     Years since quitting: 15.6     Passive exposure: Past    Smokeless tobacco: Never    Tobacco comments:     quit    Vaping Use    Vaping Use: Never used   Substance and Sexual Activity    Alcohol use: Not Currently     Comment: 5 aweek    Drug use: Never    Sexual activity: Defer     Birth  control/protection: None, Surgical        Current Outpatient Medications:     atorvastatin (LIPITOR) 20 MG tablet, Take 1 tablet by mouth Daily., Disp: 30 tablet, Rfl: 11    FLUoxetine (PROzac) 40 MG capsule, Take 1 capsule by mouth Daily., Disp: 30 capsule, Rfl: 11    omeprazole (priLOSEC) 40 MG capsule, Take 1 capsule by mouth Daily., Disp: 30 capsule, Rfl: 11    Cholecalciferol 25 MCG (1000 UT) tablet, Take 2 tablets by mouth Daily., Disp: , Rfl:     vitamin B-12 (CYANOCOBALAMIN) 100 MCG tablet, Take 1 tablet by mouth Daily., Disp: , Rfl:      Objective     History of Present Illness     Review of Systems    Physical Exam  Vitals and nursing note reviewed.   Constitutional:       General: She is not in acute distress.     Appearance: Normal appearance. She is not ill-appearing.   HENT:      Head: Normocephalic and atraumatic.   Cardiovascular:      Rate and Rhythm: Normal rate and regular rhythm.      Heart sounds: Normal heart sounds.   Pulmonary:      Effort: Pulmonary effort is normal. No respiratory distress.      Breath sounds: Normal breath sounds. No wheezing or rales.   Skin:     General: Skin is warm and dry.   Neurological:      General: No focal deficit present.      Mental Status: She is alert and oriented to person, place, and time.   Psychiatric:         Mood and Affect: Mood normal.         Behavior: Behavior normal.       Assessment    ASSESSMENT vitamin D was improved but still low at 24.5.  TSH was normal.  Vitamin B12 was low normal at 273.  CBC was normal.  CMP had minimally elevated AST of 57 and ALT of 45 and was otherwise normal.  Lipid panel off medication was high with total cholesterol 227, HDL 60 and .  #1-hyperlipidemia  #2-vitamin D deficiency  #3-history of GERD  #4-low normal vitamin B12    Problems Addressed this Visit          Cardiac and Vasculature    HLD (hyperlipidemia) - Primary    Relevant Medications    atorvastatin (LIPITOR) 20 MG tablet       Endocrine and  Metabolic    Vitamin D deficiency     Diagnoses         Codes Comments    Hyperlipidemia, unspecified hyperlipidemia type    -  Primary ICD-10-CM: E78.5  ICD-9-CM: 272.4     Vitamin D deficiency     ICD-10-CM: E55.9  ICD-9-CM: 268.9             PLAN the patient is restarted her atorvastatin 20 mg.  She will increase her vitamin D to 2000 units daily.  I did recommend she consider a vitamin B12 supplement once a day.  She will continue other medicines and I plan on rechecking her in 3 months with a CMP, lipid panel, vitamin B12 and vitamin D    There are no Patient Instructions on file for this visit.  No follow-ups on file.

## 2023-08-28 RX ORDER — ATORVASTATIN CALCIUM 20 MG/1
20 TABLET, FILM COATED ORAL DAILY
Qty: 30 TABLET | Refills: 11 | Status: SHIPPED | OUTPATIENT
Start: 2023-08-28

## 2023-08-28 RX ORDER — OMEPRAZOLE 40 MG/1
40 CAPSULE, DELAYED RELEASE ORAL DAILY
Qty: 30 CAPSULE | Refills: 11 | Status: SHIPPED | OUTPATIENT
Start: 2023-08-28

## 2023-08-28 RX ORDER — FLUOXETINE HYDROCHLORIDE 40 MG/1
40 CAPSULE ORAL DAILY
Qty: 30 CAPSULE | Refills: 11 | Status: SHIPPED | OUTPATIENT
Start: 2023-08-28

## 2023-10-27 ENCOUNTER — APPOINTMENT (OUTPATIENT)
Dept: CT IMAGING | Facility: HOSPITAL | Age: 48
End: 2023-10-27
Payer: COMMERCIAL

## 2023-10-27 ENCOUNTER — HOSPITAL ENCOUNTER (EMERGENCY)
Facility: HOSPITAL | Age: 48
Discharge: HOME | End: 2023-10-27
Attending: EMERGENCY MEDICINE
Payer: COMMERCIAL

## 2023-10-27 VITALS
HEIGHT: 69 IN | BODY MASS INDEX: 25.62 KG/M2 | TEMPERATURE: 97.6 F | DIASTOLIC BLOOD PRESSURE: 59 MMHG | HEART RATE: 75 BPM | WEIGHT: 173 LBS | OXYGEN SATURATION: 100 % | RESPIRATION RATE: 17 BRPM | SYSTOLIC BLOOD PRESSURE: 94 MMHG

## 2023-10-27 DIAGNOSIS — R55 VASOVAGAL SYNCOPE: Primary | ICD-10-CM

## 2023-10-27 LAB
GLUCOSE BLDC GLUCOMTR-MCNC: 140 MG/DL (ref 70–130)
QT INTERVAL: 456 MS
QTC INTERVAL: 520 MS

## 2023-10-27 PROCEDURE — 63710000001 ONDANSETRON ODT 4 MG TABLET DISPERSIBLE: Performed by: EMERGENCY MEDICINE

## 2023-10-27 PROCEDURE — 63710000001 PROMETHAZINE PER 25 MG: Performed by: EMERGENCY MEDICINE

## 2023-10-27 PROCEDURE — 96372 THER/PROPH/DIAG INJ SC/IM: CPT

## 2023-10-27 PROCEDURE — 93010 ELECTROCARDIOGRAM REPORT: CPT | Performed by: INTERNAL MEDICINE

## 2023-10-27 PROCEDURE — 82948 REAGENT STRIP/BLOOD GLUCOSE: CPT

## 2023-10-27 PROCEDURE — 99284 EMERGENCY DEPT VISIT MOD MDM: CPT

## 2023-10-27 PROCEDURE — 93005 ELECTROCARDIOGRAM TRACING: CPT | Performed by: EMERGENCY MEDICINE

## 2023-10-27 PROCEDURE — 25010000002 METOCLOPRAMIDE PER 10 MG: Performed by: EMERGENCY MEDICINE

## 2023-10-27 PROCEDURE — 70450 CT HEAD/BRAIN W/O DYE: CPT

## 2023-10-27 PROCEDURE — 99283 EMERGENCY DEPT VISIT LOW MDM: CPT | Performed by: EMERGENCY MEDICINE

## 2023-10-27 RX ORDER — ONDANSETRON 4 MG/1
4 TABLET, ORALLY DISINTEGRATING ORAL ONCE
Status: COMPLETED | OUTPATIENT
Start: 2023-10-27 | End: 2023-10-27

## 2023-10-27 RX ORDER — METOCLOPRAMIDE HYDROCHLORIDE 5 MG/ML
10 INJECTION INTRAMUSCULAR; INTRAVENOUS ONCE
Status: COMPLETED | OUTPATIENT
Start: 2023-10-27 | End: 2023-10-27

## 2023-10-27 RX ORDER — ESTRADIOL 0.05 MG/D
1 PATCH, EXTENDED RELEASE TRANSDERMAL WEEKLY
COMMUNITY
Start: 2023-10-19

## 2023-10-27 RX ORDER — PROMETHAZINE HYDROCHLORIDE 25 MG/1
25 TABLET ORAL ONCE
Status: COMPLETED | OUTPATIENT
Start: 2023-10-27 | End: 2023-10-27

## 2023-10-27 RX ADMIN — METOCLOPRAMIDE 10 MG: 5 INJECTION, SOLUTION INTRAMUSCULAR; INTRAVENOUS at 10:16

## 2023-10-27 RX ADMIN — ONDANSETRON 4 MG: 4 TABLET, ORALLY DISINTEGRATING ORAL at 09:11

## 2023-10-27 RX ADMIN — PROMETHAZINE HYDROCHLORIDE 25 MG: 25 TABLET ORAL at 10:06

## 2023-10-27 NOTE — FSED PROVIDER NOTE
"Subjective   History of Present Illness  48-year-old febrile which with chief complaint of possible seizure.  I spoke with the phlebotomist on the telephone who lauro the patient's labs.  She states when she started to stick the patient her eyes rolled back and the patient became pale.  Patient passed out.  Phlebotomist says she did not witness seizure activity.  However when another personnel in the lab came in she thought she witnessed seizure-like activity.  She states patient was drooling from her mouth.  Patient's eyes were rolled back.  She states her head was shaking for a couple seconds.  She says she did not witness the arms or legs shaking.  She states patient regained consciousness within a few seconds.  Patient admits to history of vasovagal syncope due to blood draw in the past.  Patient states when she was younger she passed out \"all the time\" when blood was drawn.  She states with recent blood draws she gets very nauseous and lightheaded.  She denies any pain at bedside.  Denies hitting her head.  Denies headache or neck pain.  Denies chest pain or shortness of air.  Admits to history of dyslipidemia and anxiety.  Denies previous cardiac history.  Patient states blood was being drawn today for routine check of her cholesterol      Review of Systems   Neurological:  Positive for syncope.   All other systems reviewed and are negative.      Past Medical History:   Diagnosis Date   • Abnormal uterine bleeding (AUB) 05/2015   • Allergic rhinitis    • Anal skin tag 03/2021    S/P EXCISION   • Anxiety    • Colon polyps     FOLLOWED BY DR. DAVE BAINS   • Decreased libido    • Endometrial mass 05/2015   • Fall 09/2020    CONTUSION OF RIGHT BACK WALL OF THORAX, MUSCLE STRAIN   • Hemorrhoids    • Hyperlipidemia    • Idiopathic scoliosis of thoracic spine 1/30/2018    Dextroscoliosis of the thoracic spine   • Infertility, female    • Influenza A 02/08/2018   • Laryngeal spasm 10/2018   • Migraines    • Recurrent " cold sores 2018   • SAB (spontaneous ) 2013     A1   • Uterine fibroid    • Vitamin D deficiency    • Vulvodynia        Allergies   Allergen Reactions   • Latex Rash       Past Surgical History:   Procedure Laterality Date   • COLONOSCOPY N/A 2006    HEMORRHOIDS, DR. DARLENE BAINS AT [facility]   • COLONOSCOPY N/A 2021    5 POLYPS REMOVED, ALL BENIGN EXCEPT FOR POLYP IN TRANSVERSE WAS TUBULAR ADENOMA , ANAL SKIN TAGS BENIGN, RESCOPE IN 3 YRS, DR. DAVE BAINS AT [facility]   • DILATATION AND CURETTAGE N/A 2013    FOR SAB, DR. LAVELL BLANCA AT [facility]   • HYSTERECTOMY N/A 2015    LAPAROSCOPIC VAGINAL HYSTERECTOMY WITH BSO, DR. CONCEPCION ROMAN AT [facility]   • HYSTEROSCOPY MYOMECTOMY N/A 2015    ENDOMETRIAL MASS, DR. CONCEPCION ROMAN AT [facility]       Family History   Problem Relation Age of Onset   • Depression Other    • Gout Other    • Hyperlipidemia Other    • Colon cancer Other    • Diabetes Other    • Colon polyps Other    • Cancer Maternal Grandmother    • Kidney disease Maternal Grandmother    • Brain cancer Paternal Grandmother    • Cancer Paternal Grandmother    • COPD Paternal Grandfather    • Alcohol abuse Mother    • Hypertension Mother    • Other Mother         SEPSIS   • Alcohol abuse Father    • Hypertension Father    • Colon polyps Father    • Hypertension Brother    • Alcohol abuse Maternal Grandfather    • Cancer Maternal Grandfather    • Kidney cancer Maternal Grandfather        Social History     Socioeconomic History   • Marital status:    Tobacco Use   • Smoking status: Former     Packs/day: 1.00     Years: 15.00     Additional pack years: 0.00     Total pack years: 15.00     Types: Cigarettes     Start date:      Quit date: 2008     Years since quitting: 15.8     Passive exposure: Past   • Smokeless tobacco: Never   • Tobacco comments:     quit    Vaping Use   • Vaping Use: Never used   Substance and Sexual Activity   • Alcohol use: Not Currently      Comment: 5 aweek   • Drug use: Never   • Sexual activity: Defer     Birth control/protection: None, Surgical           Objective   Physical Exam  Vitals and nursing note reviewed.   Constitutional:       General: She is not in acute distress.     Appearance: Normal appearance. She is not toxic-appearing.   HENT:      Head: Normocephalic and atraumatic.      Right Ear: Tympanic membrane normal.      Left Ear: Tympanic membrane normal.      Nose: Nose normal.      Mouth/Throat:      Mouth: Mucous membranes are moist.   Eyes:      Extraocular Movements: Extraocular movements intact.      Conjunctiva/sclera: Conjunctivae normal.   Cardiovascular:      Rate and Rhythm: Normal rate and regular rhythm.      Heart sounds: Normal heart sounds.   Pulmonary:      Breath sounds: Normal breath sounds.   Abdominal:      Palpations: Abdomen is soft.      Tenderness: There is no abdominal tenderness. There is no guarding or rebound.   Musculoskeletal:         General: Normal range of motion.      Cervical back: Normal range of motion. No tenderness.   Skin:     General: Skin is warm.   Neurological:      General: No focal deficit present.      Mental Status: She is alert and oriented to person, place, and time.   Psychiatric:         Mood and Affect: Mood normal.         Behavior: Behavior normal.         Judgment: Judgment normal.       Procedures           ED Course                                           Medical Decision Making  On physical exam no focal deficits observed.  There is conflicting history with the phlebotomist and personnel upstairs if the patient had a seizure.  1 person said she did not see seizure-like activity while another person states she thinks patient may have had seizure-like activity.  It is likely patient had vasovagal syncopal episode when blood was drawn.  Patient states she has had vasovagal syncope in the past when blood was drawn.  Patient denies history of seizures.  Patient refuses blood work  at bedside because of her recent episode of vasovagal syncope.  Patient states she is frightened of needles.  Patient states she does not want to pass out again.  Since patient refuses blood draw EKG and CT head was obtained.  Patient given Zofran ODT.  On reevaluation at 11:20 AM patient is resting comfortably at bedside.  Patient is asymptomatic.  Nausea has resolved.    Problems Addressed:  Vasovagal syncope: complicated acute illness or injury    Amount and/or Complexity of Data Reviewed  Labs: ordered.     Details: Labs Reviewed  POCT GLUCOSE FINGERSTICK - Abnormal; Notable for the following components:     Glucose                       140 (*)             All other components within normal limits  POCT GLUCOSE FINGERSTICK    Radiology: ordered.     Details: CT Head Without Contrast    Result Date: 10/27/2023  Narrative: CT OF THE BRAIN WITHOUT CONTRAST 10/27/2023  HISTORY: Seizure. Memory loss. Dizziness.  Axial images were obtained through the brain without intravenous contrast.  The brain parenchyma and ventricular system appear within normal limits. No mass lesions, midline shift, intracranial hemorrhage or evidence of infarction is demonstrated.  No bony abnormalities are seen.      Impression: 1. No acute process identified.  Radiation dose reduction techniques were utilized, including automated exposure control and exposure modulation based on body size.   This report was finalized on 10/27/2023 9:41 AM by Dr. Uziel Castellano M.D on Workstation: [id number]        ECG/medicine tests: ordered.     Details: Normal sinus rhythm, heart rate 78bpm    Risk  Prescription drug management.        Final diagnoses:   Vasovagal syncope       ED Disposition  ED Disposition       ED Disposition   Discharge    Condition   Stable    Comment   --               Shawn Romero MD  [id number] [address]  [phone]    In 1 day  Follow-up with your primary care doctor as soon as possible.  Return for any  worsening conditions.         Medication List      No changes were made to your prescriptions during this visit.

## 2023-10-27 NOTE — FSED PROVIDER NOTE
"Subjective   History of Present Illness  48-year-old febrile which with chief complaint of possible seizure.  I spoke with the phlebotomist on the telephone who lauro the patient's labs.  She states when she started to stick the patient her eyes rolled back and the patient became pale.  Patient passed out.  Phlebotomist says she did not witness seizure activity.  However when another personnel in the lab came in she thought she witnessed seizure-like activity.  She states patient was drooling from her mouth.  Patient's eyes were rolled back.  She states her head was shaking for a couple seconds.  She says she did not witness the arms or legs shaking.  She states patient regained consciousness within a few seconds.  Patient admits to history of vasovagal syncope due to blood draw in the past.  Patient states when she was younger she passed out \"all the time\" when blood was drawn.  She states with recent blood draws she gets very nauseous and lightheaded.  She denies any pain at bedside.  Denies hitting her head.  Denies headache or neck pain.  Denies chest pain or shortness of air.  Admits to history of dyslipidemia and anxiety.  Denies previous cardiac history.  Patient states blood was being drawn today for routine check of her cholesterol        Review of Systems   Neurological:  Positive for syncope.   All other systems reviewed and are negative.      Past Medical History:   Diagnosis Date    Abnormal uterine bleeding (AUB) 05/2015    Allergic rhinitis     Anal skin tag 03/2021    S/P EXCISION    Anxiety     Colon polyps     FOLLOWED BY DR. DAVE BAINS    Decreased libido     Endometrial mass 05/2015    Fall 09/2020    CONTUSION OF RIGHT BACK WALL OF THORAX, MUSCLE STRAIN    Hemorrhoids     Hyperlipidemia     Idiopathic scoliosis of thoracic spine 1/30/2018    Dextroscoliosis of the thoracic spine    Infertility, female     Influenza A 02/08/2018    Laryngeal spasm 10/2018    Migraines     Recurrent cold sores " 2018    SAB (spontaneous ) 2013     A1    Uterine fibroid     Vitamin D deficiency     Vulvodynia        Allergies   Allergen Reactions    Latex Rash       Past Surgical History:   Procedure Laterality Date    COLONOSCOPY N/A 2006    HEMORRHOIDS, DR. DARLENE BAINS AT [facility]    COLONOSCOPY N/A 2021    5 POLYPS REMOVED, ALL BENIGN EXCEPT FOR POLYP IN TRANSVERSE WAS TUBULAR ADENOMA , ANAL SKIN TAGS BENIGN, RESCOPE IN 3 YRS, DR. DAVE BAINS AT [facility]    DILATATION AND CURETTAGE N/A 2013    FOR SAB, DR. LAVELL BLANCA AT [facility]    HYSTERECTOMY N/A 2015    LAPAROSCOPIC VAGINAL HYSTERECTOMY WITH BSO, DR. CONCEPCION ROMAN AT [facility]    HYSTEROSCOPY MYOMECTOMY N/A 2015    ENDOMETRIAL MASS, DR. CONCEPCION ROMAN AT [facility]       Family History   Problem Relation Age of Onset    Depression Other     Gout Other     Hyperlipidemia Other     Colon cancer Other     Diabetes Other     Colon polyps Other     Cancer Maternal Grandmother     Kidney disease Maternal Grandmother     Brain cancer Paternal Grandmother     Cancer Paternal Grandmother     COPD Paternal Grandfather     Alcohol abuse Mother     Hypertension Mother     Other Mother         SEPSIS    Alcohol abuse Father     Hypertension Father     Colon polyps Father     Hypertension Brother     Alcohol abuse Maternal Grandfather     Cancer Maternal Grandfather     Kidney cancer Maternal Grandfather        Social History     Socioeconomic History    Marital status:    Tobacco Use    Smoking status: Former     Packs/day: 1.00     Years: 15.00     Additional pack years: 0.00     Total pack years: 15.00     Types: Cigarettes     Start date:      Quit date: 2008     Years since quitting: 15.8     Passive exposure: Past    Smokeless tobacco: Never    Tobacco comments:     quit 2008   Vaping Use    Vaping Use: Never used   Substance and Sexual Activity    Alcohol use: Not Currently     Comment: 5 aweek    Drug use: Never    Sexual  activity: Defer     Birth control/protection: None, Surgical           Objective   Physical Exam  Vitals and nursing note reviewed.   Constitutional:       General: She is not in acute distress.     Appearance: Normal appearance. She is not toxic-appearing.   HENT:      Head: Normocephalic and atraumatic.      Right Ear: Tympanic membrane normal.      Left Ear: Tympanic membrane normal.      Nose: Nose normal.      Mouth/Throat:      Mouth: Mucous membranes are moist.   Eyes:      Extraocular Movements: Extraocular movements intact.      Conjunctiva/sclera: Conjunctivae normal.   Cardiovascular:      Rate and Rhythm: Normal rate and regular rhythm.      Heart sounds: Normal heart sounds.   Pulmonary:      Breath sounds: Normal breath sounds.   Abdominal:      Palpations: Abdomen is soft.      Tenderness: There is no abdominal tenderness. There is no guarding or rebound.   Musculoskeletal:         General: Normal range of motion.      Cervical back: Normal range of motion. No tenderness.   Skin:     General: Skin is warm.   Neurological:      General: No focal deficit present.      Mental Status: She is alert and oriented to person, place, and time.   Psychiatric:         Mood and Affect: Mood normal.         Behavior: Behavior normal.         Judgment: Judgment normal.         Procedures           ED Course                                           Medical Decision Making  On physical exam no focal deficits observed.  There is conflicting history with the phlebotomist and personnel upstairs if the patient had a seizure.  1 person said she did not see seizure-like activity while another person states she thinks patient may have had seizure-like activity.  It is likely patient had vasovagal syncopal episode when blood was drawn.  Patient states she has had vasovagal syncope in the past when blood was drawn.  Patient denies history of seizures.  Patient refuses blood work at bedside because of her recent episode of  vasovagal syncope.  Patient states she is frightened of needles.  Patient states she does not want to pass out again.  Since patient refuses blood draw EKG and CT head was obtained.  Patient given Zofran ODT.  On reevaluation at 11:20 AM patient is resting comfortably at bedside.  Patient is asymptomatic.  Nausea has resolved.    Amount and/or Complexity of Data Reviewed  Labs: ordered.     Details: Labs Reviewed  POCT GLUCOSE FINGERSTICK - Abnormal; Notable for the following components:     Glucose                       140 (*)             All other components within normal limits  POCT GLUCOSE FINGERSTICK    Radiology: ordered.     Details: CT Head Without Contrast    Result Date: 10/27/2023  Narrative: CT OF THE BRAIN WITHOUT CONTRAST 10/27/2023  HISTORY: Seizure. Memory loss. Dizziness.  Axial images were obtained through the brain without intravenous contrast.  The brain parenchyma and ventricular system appear within normal limits. No mass lesions, midline shift, intracranial hemorrhage or evidence of infarction is demonstrated.  No bony abnormalities are seen.      Impression: 1. No acute process identified.  Radiation dose reduction techniques were utilized, including automated exposure control and exposure modulation based on body size.   This report was finalized on 10/27/2023 9:41 AM by Dr. Uziel Castellano M.D on Workstation: [id number]        ECG/medicine tests: ordered.     Details: Normal sinus rhythm, heart rate 78bpm    Risk  Prescription drug management.        Final diagnoses:   Vasovagal syncope       ED Disposition  ED Disposition       ED Disposition   Discharge    Condition   Stable    Comment   --               Shawn Romero MD  [id number] [id number] [phone]    In 1 day  Follow-up with your primary care doctor as soon as possible.  Return for any worsening conditions.         Medication List      No changes were made to your prescriptions during this visit.          List      No changes were made to your prescriptions during this visit.

## 2023-11-09 ENCOUNTER — OFFICE VISIT (OUTPATIENT)
Dept: FAMILY MEDICINE CLINIC | Facility: CLINIC | Age: 48
End: 2023-11-09
Payer: COMMERCIAL

## 2023-11-09 VITALS
DIASTOLIC BLOOD PRESSURE: 80 MMHG | HEART RATE: 74 BPM | TEMPERATURE: 97 F | RESPIRATION RATE: 16 BRPM | HEIGHT: 69 IN | BODY MASS INDEX: 26.19 KG/M2 | SYSTOLIC BLOOD PRESSURE: 118 MMHG | WEIGHT: 176.8 LBS | OXYGEN SATURATION: 98 %

## 2023-11-09 DIAGNOSIS — E55.9 VITAMIN D DEFICIENCY: ICD-10-CM

## 2023-11-09 DIAGNOSIS — E78.5 HYPERLIPIDEMIA, UNSPECIFIED HYPERLIPIDEMIA TYPE: Primary | ICD-10-CM

## 2023-11-09 DIAGNOSIS — R55 VASOVAGAL SYNCOPE: ICD-10-CM

## 2023-11-09 DIAGNOSIS — F41.1 ANXIETY, GENERALIZED: ICD-10-CM

## 2023-11-09 DIAGNOSIS — K21.9 GASTROESOPHAGEAL REFLUX DISEASE, UNSPECIFIED WHETHER ESOPHAGITIS PRESENT: ICD-10-CM

## 2023-11-09 PROCEDURE — 99214 OFFICE O/P EST MOD 30 MIN: CPT | Performed by: INTERNAL MEDICINE

## 2023-11-09 NOTE — PROGRESS NOTES
Subjective   Marlene Shepherd is a 48 y.o. female. Patient is here today for follow-up from the emergency room visit.  Patient was having laboratory blood being drawn and passed out.  She was evaluated in the emergency room and had a CT scan which was normal.  There was some slight question of possible seizure activity but I do not believe that.  She does have a history of these episodes in the past.  She has had no further problems and feels great.  With this episode there was no definite seizure activity, no incontinence and no postictal confusion.  Chief Complaint   Patient presents with    Loss of Consciousness     Vasovagal syncope;10/27/23  ER          Vitals:    23 1533   BP: 118/80   Pulse: 74   Resp: 16   Temp: 97 °F (36.1 °C)   SpO2: 98%     Body mass index is 26.1 kg/m².  The following portions of the patient's history were reviewed and updated as appropriate: allergies, current medications, past family history, past medical history, past social history, past surgical history and problem list.    Past Medical History:   Diagnosis Date    Abnormal uterine bleeding (AUB) 2015    Allergic rhinitis     Anal skin tag 2021    S/P EXCISION    Anxiety     Colon polyps     FOLLOWED BY DR. DAVE BAINS    Decreased libido     Endometrial mass 2015    Fall 2020    CONTUSION OF RIGHT BACK WALL OF THORAX, MUSCLE STRAIN    Hemorrhoids     Hyperlipidemia     Idiopathic scoliosis of thoracic spine 2018    Dextroscoliosis of the thoracic spine    Infertility, female     Influenza A 2018    Laryngeal spasm 10/2018    Migraines     Recurrent cold sores 2018    SAB (spontaneous ) 2013     A1    Uterine fibroid     Vitamin D deficiency     Vulvodynia       Allergies   Allergen Reactions    Latex Rash      Social History     Socioeconomic History    Marital status:    Tobacco Use    Smoking status: Former     Packs/day: 1.00     Years: 15.00     Additional pack  years: 0.00     Total pack years: 15.00     Types: Cigarettes     Start date: 1993     Quit date: 2008     Years since quitting: 15.8     Passive exposure: Past    Smokeless tobacco: Never    Tobacco comments:     quit 2008   Vaping Use    Vaping Use: Never used   Substance and Sexual Activity    Alcohol use: Not Currently     Comment: 5 aweek    Drug use: Never    Sexual activity: Defer     Birth control/protection: None, Surgical        Current Outpatient Medications:     atorvastatin (LIPITOR) 20 MG tablet, TAKE 1 TABLET BY MOUTH DAILY, Disp: 30 tablet, Rfl: 11    Cholecalciferol 25 MCG (1000 UT) tablet, Take 3 tablets by mouth Daily., Disp: , Rfl:     FLUoxetine (PROzac) 40 MG capsule, TAKE 1 CAPSULE BY MOUTH DAILY, Disp: 30 capsule, Rfl: 11    Lyllana 0.05 MG/24HR patch, Place 1 patch on the skin as directed by provider 1 (One) Time Per Week., Disp: , Rfl:     omeprazole (priLOSEC) 40 MG capsule, TAKE 1 CAPSULE BY MOUTH DAILY, Disp: 30 capsule, Rfl: 11    vitamin B-12 (CYANOCOBALAMIN) 100 MCG tablet, Take 1 tablet by mouth Daily., Disp: , Rfl:      Objective     History of Present Illness     Review of Systems    Physical Exam  Vitals and nursing note reviewed.   Constitutional:       Appearance: Normal appearance.   HENT:      Head: Normocephalic and atraumatic.   Cardiovascular:      Rate and Rhythm: Normal rate and regular rhythm.      Heart sounds: Normal heart sounds.   Pulmonary:      Effort: Pulmonary effort is normal. No respiratory distress.      Breath sounds: Normal breath sounds. No wheezing or rales.   Skin:     General: Skin is warm and dry.   Neurological:      General: No focal deficit present.      Mental Status: She is alert and oriented to person, place, and time.   Psychiatric:         Mood and Affect: Mood normal.         Behavior: Behavior normal.         Assessment    ASSESSMENT CMP was completely normal.  Lipid panel was excellent with total cholesterol 129, HDL 54 and LDL 64 on  medication.  Vitamin D level was still slightly low at 26.5 although improved.  Vitamin B12 level was low normal.  #1-vasovagal syncope related to blood drawing  #2-hyperlipidemia, well controlled on medication  #3-vitamin D deficiency, still slightly low  #4-generalized anxiety, stable on Prozac  #5-GERD, stable on omeprazole    Problems Addressed this Visit          Cardiac and Vasculature    HLD (hyperlipidemia) - Primary       Endocrine and Metabolic    Vitamin D deficiency       Gastrointestinal Abdominal     GERD (gastroesophageal reflux disease)     Other Visit Diagnoses       Vasovagal syncope              Diagnoses         Codes Comments    Hyperlipidemia, unspecified hyperlipidemia type    -  Primary ICD-10-CM: E78.5  ICD-9-CM: 272.4     Vitamin D deficiency     ICD-10-CM: E55.9  ICD-9-CM: 268.9     Gastroesophageal reflux disease, unspecified whether esophagitis present     ICD-10-CM: K21.9  ICD-9-CM: 530.81     Vasovagal syncope     ICD-10-CM: R55  ICD-9-CM: 780.2             PLAN I do not think the patient needs neurologic evaluation or an EEG.  This clearly was a vasovagal episode related with blood drawing.  I advised the patient to try and be lying down with blood drawing in the future.  She will continue current medicines as now and can be rechecked in 6 months with a CBC, CMP, lipid panel, vitamin B12 level, TSH and free T4 and vitamin D level    There are no Patient Instructions on file for this visit.  Return in about 6 months (around 5/9/2024) for with labs.

## 2024-04-23 ENCOUNTER — PREP FOR SURGERY (OUTPATIENT)
Dept: OTHER | Facility: HOSPITAL | Age: 49
End: 2024-04-23
Payer: COMMERCIAL

## 2024-04-23 DIAGNOSIS — Z80.0 FAMILY HISTORY OF COLON CANCER: ICD-10-CM

## 2024-04-23 DIAGNOSIS — Z83.719 FAMILY HISTORY OF POLYPS IN THE COLON: ICD-10-CM

## 2024-04-23 DIAGNOSIS — Z86.010 HISTORY OF COLON POLYPS: Primary | ICD-10-CM

## 2024-04-25 PROBLEM — Z83.719 FAMILY HISTORY OF POLYPS IN THE COLON: Status: ACTIVE | Noted: 2024-04-23

## 2024-04-25 PROBLEM — Z80.0 FAMILY HISTORY OF COLON CANCER: Status: ACTIVE | Noted: 2024-04-23

## 2024-04-25 PROBLEM — Z86.0100 HISTORY OF COLON POLYPS: Status: ACTIVE | Noted: 2024-04-23

## 2024-04-25 PROBLEM — Z86.010 HISTORY OF COLON POLYPS: Status: ACTIVE | Noted: 2024-04-23

## 2024-08-20 ENCOUNTER — TELEPHONE (OUTPATIENT)
Dept: SURGERY | Facility: CLINIC | Age: 49
End: 2024-08-20
Payer: COMMERCIAL

## 2024-08-20 NOTE — TELEPHONE ENCOUNTER
Message was left on patients voicemail to call Kaitlynn at the office.  I was calling to get her updated insurance information.  [facility] had called to let us know that her insurance had terminated and they needed her new insurance information with the insurance info updated on a CY order for her colonoscopy on 9/18.  Patient was given my office number to call.

## 2024-08-21 ENCOUNTER — TELEPHONE (OUTPATIENT)
Dept: SURGERY | Facility: CLINIC | Age: 49
End: 2024-08-21
Payer: COMMERCIAL

## 2024-08-21 NOTE — TELEPHONE ENCOUNTER
Message left on patients voicemail to call Kaitlynn at the office.  I was calling to get her insurance information for her colonoscopy on 9/18/24

## 2024-09-18 ENCOUNTER — ANESTHESIA (OUTPATIENT)
Dept: GASTROENTEROLOGY | Facility: HOSPITAL | Age: 49
End: 2024-09-18
Payer: COMMERCIAL

## 2024-09-18 ENCOUNTER — HOSPITAL ENCOUNTER (OUTPATIENT)
Facility: HOSPITAL | Age: 49
Setting detail: HOSPITAL OUTPATIENT SURGERY
Discharge: HOME | End: 2024-09-18
Attending: COLON & RECTAL SURGERY | Admitting: COLON & RECTAL SURGERY
Payer: COMMERCIAL

## 2024-09-18 ENCOUNTER — ANESTHESIA EVENT (OUTPATIENT)
Dept: GASTROENTEROLOGY | Facility: HOSPITAL | Age: 49
End: 2024-09-18
Payer: COMMERCIAL

## 2024-09-18 VITALS
RESPIRATION RATE: 16 BRPM | OXYGEN SATURATION: 100 % | HEART RATE: 61 BPM | DIASTOLIC BLOOD PRESSURE: 57 MMHG | BODY MASS INDEX: 22.05 KG/M2 | SYSTOLIC BLOOD PRESSURE: 92 MMHG | HEIGHT: 69 IN | WEIGHT: 148.9 LBS

## 2024-09-18 DIAGNOSIS — Z86.010 HISTORY OF COLON POLYPS: ICD-10-CM

## 2024-09-18 DIAGNOSIS — Z83.719 FAMILY HISTORY OF POLYPS IN THE COLON: ICD-10-CM

## 2024-09-18 DIAGNOSIS — Z80.0 FAMILY HISTORY OF COLON CANCER: ICD-10-CM

## 2024-09-18 PROCEDURE — 88305 TISSUE EXAM BY PATHOLOGIST: CPT | Performed by: COLON & RECTAL SURGERY

## 2024-09-18 PROCEDURE — 25010000002 PROPOFOL 1000 MG/100ML EMULSION: Performed by: NURSE ANESTHETIST, CERTIFIED REGISTERED

## 2024-09-18 PROCEDURE — 25010000002 PROPOFOL 200 MG/20ML EMULSION: Performed by: NURSE ANESTHETIST, CERTIFIED REGISTERED

## 2024-09-18 PROCEDURE — 25810000003 LACTATED RINGERS PER 1000 ML: Performed by: COLON & RECTAL SURGERY

## 2024-09-18 RX ORDER — PROPOFOL 10 MG/ML
INJECTION, EMULSION INTRAVENOUS CONTINUOUS PRN
Status: DISCONTINUED | OUTPATIENT
Start: 2024-09-18 | End: 2024-09-18 | Stop reason: SURG

## 2024-09-18 RX ORDER — LIDOCAINE HYDROCHLORIDE 20 MG/ML
INJECTION, SOLUTION INFILTRATION; PERINEURAL AS NEEDED
Status: DISCONTINUED | OUTPATIENT
Start: 2024-09-18 | End: 2024-09-18 | Stop reason: SURG

## 2024-09-18 RX ORDER — PROPOFOL 10 MG/ML
INJECTION, EMULSION INTRAVENOUS AS NEEDED
Status: DISCONTINUED | OUTPATIENT
Start: 2024-09-18 | End: 2024-09-18 | Stop reason: SURG

## 2024-09-18 RX ORDER — SODIUM CHLORIDE, SODIUM LACTATE, POTASSIUM CHLORIDE, CALCIUM CHLORIDE 600; 310; 30; 20 MG/100ML; MG/100ML; MG/100ML; MG/100ML
1000 INJECTION, SOLUTION INTRAVENOUS CONTINUOUS
Status: DISCONTINUED | OUTPATIENT
Start: 2024-09-18 | End: 2024-09-18 | Stop reason: HOSPADM

## 2024-09-18 RX ADMIN — PROPOFOL 140 MCG/KG/MIN: 10 INJECTION, EMULSION INTRAVENOUS at 09:36

## 2024-09-18 RX ADMIN — LIDOCAINE HYDROCHLORIDE 60 MG: 20 INJECTION, SOLUTION INFILTRATION; PERINEURAL at 09:35

## 2024-09-18 RX ADMIN — SODIUM CHLORIDE, POTASSIUM CHLORIDE, SODIUM LACTATE AND CALCIUM CHLORIDE 1000 ML: 600; 310; 30; 20 INJECTION, SOLUTION INTRAVENOUS at 09:19

## 2024-09-18 RX ADMIN — PROPOFOL INJECTABLE EMULSION 30 MG: 10 INJECTION, EMULSION INTRAVENOUS at 09:41

## 2024-09-18 RX ADMIN — PROPOFOL INJECTABLE EMULSION 60 MG: 10 INJECTION, EMULSION INTRAVENOUS at 09:36

## 2024-09-18 RX ADMIN — PROPOFOL INJECTABLE EMULSION 40 MG: 10 INJECTION, EMULSION INTRAVENOUS at 09:38

## 2024-09-18 RX ADMIN — PROPOFOL INJECTABLE EMULSION 20 MG: 10 INJECTION, EMULSION INTRAVENOUS at 09:49

## 2024-09-19 LAB
CYTO UR: NORMAL
LAB AP CASE REPORT: NORMAL
PATH REPORT.FINAL DX SPEC: NORMAL
PATH REPORT.GROSS SPEC: NORMAL

## 2024-10-11 ENCOUNTER — OFFICE VISIT (OUTPATIENT)
Dept: FAMILY MEDICINE CLINIC | Facility: CLINIC | Age: 49
End: 2024-10-11
Payer: COMMERCIAL

## 2024-10-11 VITALS
WEIGHT: 153 LBS | TEMPERATURE: 98 F | DIASTOLIC BLOOD PRESSURE: 70 MMHG | RESPIRATION RATE: 16 BRPM | OXYGEN SATURATION: 96 % | HEART RATE: 67 BPM | HEIGHT: 69 IN | SYSTOLIC BLOOD PRESSURE: 110 MMHG | BODY MASS INDEX: 22.66 KG/M2

## 2024-10-11 DIAGNOSIS — Z00.00 PHYSICAL EXAM, ANNUAL: ICD-10-CM

## 2024-10-11 DIAGNOSIS — Z91.89 AT RISK FOR PROLONGED QT INTERVAL SYNDROME: ICD-10-CM

## 2024-10-11 DIAGNOSIS — F41.1 ANXIETY, GENERALIZED: ICD-10-CM

## 2024-10-11 DIAGNOSIS — E55.9 VITAMIN D DEFICIENCY: Primary | ICD-10-CM

## 2024-10-11 DIAGNOSIS — R42 EPISODIC LIGHTHEADEDNESS: ICD-10-CM

## 2024-10-11 PROCEDURE — 93000 ELECTROCARDIOGRAM COMPLETE: CPT

## 2024-10-11 PROCEDURE — 99214 OFFICE O/P EST MOD 30 MIN: CPT

## 2024-10-11 RX ORDER — FLUOXETINE 40 MG/1
40 CAPSULE ORAL DAILY
Qty: 30 CAPSULE | Refills: 11 | Status: SHIPPED | OUTPATIENT
Start: 2024-10-11

## 2024-10-11 RX ORDER — ATORVASTATIN CALCIUM 20 MG/1
20 TABLET, FILM COATED ORAL DAILY
Qty: 30 TABLET | Refills: 11 | Status: SHIPPED | OUTPATIENT
Start: 2024-10-11

## 2024-10-11 NOTE — PROGRESS NOTES
"Chief Complaint  Establish Care and Annual Exam    Subjective        Marlene Shepherd presents to [facility]  History of Present Illness       No hospitalization(s) within the last year.      Reports lightheadedness lately. Occasional. No prodrome. Borderline QT prolongation I nthe past.   She reports history of vasovagal syncope. She reports h/o CJ. Last week had fainting episode. She reports poor po water intake.      EKG. Holter monitor.      Presents for refills. Refilled prozac and statin      Uses estradiol patch lyllana      Objective   Vital Signs:  /70   Pulse 67   Temp 98 °F (36.7 °C) (Oral)   Resp 16   Ht 175.3 cm (69.02\")   Wt 69.4 kg (153 lb)   SpO2 96%   BMI 22.58 kg/m²   Estimated body mass index is 22.58 kg/m² as calculated from the following:    Height as of this encounter: 175.3 cm (69.02\").    Weight as of this encounter: 69.4 kg (153 lb).    BMI is within normal parameters. No other follow-up for BMI required.      Physical Exam  Constitutional:       Appearance: Normal appearance. She is not ill-appearing, toxic-appearing or diaphoretic.   HENT:      Head: Normocephalic. No raccoon eyes, contusion, masses or laceration.      Nose: Nose normal.   Eyes:      General: No scleral icterus.        Right eye: No discharge.         Left eye: No discharge.      Extraocular Movements: Extraocular movements intact.      Pupils: Pupils are equal, round, and reactive to light.   Neck:      Thyroid: No thyromegaly.      Vascular: No JVD.   Cardiovascular:      Rate and Rhythm: Normal rate and regular rhythm.      Pulses: Normal pulses.   Pulmonary:      Effort: No accessory muscle usage or respiratory distress.      Breath sounds: Normal breath sounds. No stridor. No wheezing, rhonchi or rales.   Chest:      Chest wall: No tenderness.   Abdominal:      General: There is no distension.      Palpations: Abdomen is soft. There is no fluid wave or pulsatile mass.      " Tenderness: There is no abdominal tenderness. There is no guarding.   Musculoskeletal:         General: No swelling, tenderness or deformity.      Cervical back: Neck supple. No rigidity.   Skin:     General: Skin is warm and dry.      Coloration: Skin is not jaundiced.   Neurological:      General: No focal deficit present.      Mental Status: She is alert and oriented to person, place, and time.   Psychiatric:         Mood and Affect: Mood normal.         Thought Content: Thought content normal.         Judgment: Judgment normal.        Result Review :                Assessment and Plan   Diagnoses and all orders for this visit:    1. Vitamin D deficiency (Primary)  -     Vitamin D,25-Hydroxy; Future    2. Anxiety, generalized    3. At risk for prolonged QT interval syndrome    4. Episodic lightheadedness  -     Comprehensive Metabolic Panel; Future  -     Hemoglobin A1c; Future  -     CBC & Differential; Future  -     ECG 12 Lead    5. Physical exam, annual  -     Lipid Panel; Future  -     Microalbumin / Creatinine Urine Ratio - Urine, Clean Catch; Future    Other orders  -     FLUoxetine (PROzac) 40 MG capsule; Take 1 capsule by mouth Daily.  Dispense: 30 capsule; Refill: 11  -     atorvastatin (LIPITOR) 20 MG tablet; Take 1 tablet by mouth Daily.  Dispense: 30 tablet; Refill: 11              EKG to monitor borderline prolonged QT in the past.    No serious injuries at home.   She reports that she has a BP cuff at home. Recommend checking fduring periods of dizziness. Counseled on standing and orthostatic care.   Recommended hydration.  She reports poor p.o. intake with history of strenuous exercise.  Flu vaccine recommended.     Physical exam annual in 3month. Labs prior         Follow Up   No follow-ups on file.  Patient was given instructions and counseling regarding her condition or for health maintenance advice. Please see specific information pulled into the AVS if appropriate.

## 2024-10-11 NOTE — PROGRESS NOTES
Chief Complaint  Annual checkup    HISTORY    Marlene Shepherd is a 49 y.o. female who presents to the office today as a  a new patient for their annual preventative exam.     No hospitalization(s) within the last year.     Reports lightheadedness lately.   She reports history of vasovagal syncope. She reports h/o CJ. Last week had fainting episode. She reports poor po water intake.     EKG. Holter monitor.     Presents for refills. Refilled prozac and statin     Uses estradiol patch lyllana       Current exercise regimen:     Status of chronic medical conditions:    Patient's overall comments about their health or any other specific health concerns they report:    GYN History:     *Patient's GYN Practice: Dr. Morgan       *no method at present time    *Previous pregnancies: 1.  Live births: 0.     Health Maintenance Summary            Overdue - MAMMOGRAM (Every 2 Years) Never done      No completion history exists for this topic.              Overdue - INFLUENZA VACCINE (Yearly - August to March) Overdue since 8/1/2024      09/15/2023  Imm Admin: Influenza Injectable Mdck Pf Quad    10/05/2022  Imm Admin: Fluzone >6mos    01/30/2018  Declined              Overdue - ANNUAL PHYSICAL (Yearly) Overdue since 8/9/2024 08/09/2023  Registry Metric: Last Annual Physical              Overdue - COVID-19 Vaccine (4 - 2023-24 season) Overdue since 9/1/2024 11/06/2021  Imm Admin: COVID-19 (PFIZER) PURPLE CAP    04/21/2021  Imm Admin: COVID-19 (PFIZER) PURPLE CAP    03/31/2021  Imm Admin: COVID-19 (PFIZER) PURPLE CAP              LIPID PANEL (Yearly) Due soon on 10/27/2024      10/27/2023  Lipid Panel With LDL / HDL Ratio    08/09/2023  Lipid Panel With LDL / HDL Ratio    04/04/2022  Lipid Panel With LDL / HDL Ratio    10/23/2020  Lipid Panel With LDL / HDL Ratio    01/23/2018  Lipid Panel With LDL / HDL Ratio    Only the first 5 history entries have been loaded, but more history exists.              PAP SMEAR  (Every 3 Years) Next due on 2023  Patient-Reported (Performed Externally)    2017  Patient-Reported (Performed Externally) - gyn              COLORECTAL CANCER SCREENING (COLONOSCOPY - Every 3 Years) Next due on 2024  Colonoscopy    2024  Surgical Procedure: COLONOSCOPY    2021  COLONOSCOPY    2021  Surgical Procedure: COLONOSCOPY    2006  SCANNED - COLONOSCOPY    Only the first 5 history entries have been loaded, but more history exists.              TDAP/TD VACCINES (2 - Td or Tdap) Next due on 10/29/2030      10/29/2020  Imm Admin: Tdap              HEPATITIS C SCREENING  Completed      2022  HCV Antibody Reflex To [facility]              Pneumococcal Vaccine 0-64 (Series Information) Aged Out      No completion history exists for this topic.                     Allergies   Allergen Reactions    Latex Rash        No outpatient medications have been marked as taking for the 10/11/24 encounter (Appointment) with Delgado Dsouza DO.        Past Medical History:   Diagnosis Date    Abnormal uterine bleeding (AUB) 2015    Allergic rhinitis     Anal skin tag 2021    S/P EXCISION    Anxiety     Colon polyps     FOLLOWED BY DR. DAVE BAINS    Decreased libido     Elevated cholesterol     Endometrial mass 2015    Hemorrhoids     Hyperlipidemia     Idiopathic scoliosis of thoracic spine 2018    Dextroscoliosis of the thoracic spine    Infertility, female     Influenza A 2018    Laryngeal spasm 10/2018    Migraines     Recurrent cold sores 2018    SAB (spontaneous ) 2013     A1    Uterine fibroid     Vitamin D deficiency     Vulvodynia        Immunization History   Administered Date(s) Administered    COVID-19 (PFIZER) Purple Cap Monovalent 2021, 2021, 2021    Fluzone (or Fluarix & Flulaval for VFC) >6mos 10/05/2022    Influenza Injectable Mdck Pf Quad 09/15/2023    Tdap 10/29/2020         OBJECTIVE    Vital Signs:   There were no vitals taken for this visit.    Physical Exam     {The following data was reviewed by (Optional):01607}  {Ambulatory Labs (Optional):66532}  {Data reviewed (Optional):22553:::1}           The ASCVD Risk score (Pj CRAFT, et al., 2019) failed to calculate for the following reasons:    The valid total cholesterol range is 130 to 320 mg/dL     ASSESSMENT & PLAN     #Annual Preventative Health Examination   -Age and sex appropriate physical exam performed and documented. Updated past medical, family, social and surgical histories as well as allergies and care team list. Addressed care gaps listed in the medical record.  -Encouraged annual dental and vision exams as part of their overall health.  -Encouraged minimum of 30 minutes or more of exercise at a brisk walk or higher 5 days per week combined with a well-balanced diet.  -Advised that all women who are planning or capable of pregnancy take a daily supplement containing 0.4 to 0.8 mg (400 to 800 ?g) of folic acid.  -Immunizations reviewed and updated in EMR. {[facility] IMMUNIZATIONS:6203969866} recommended.  -Lipid screening:   Lipid Panel          10/27/2023    08:36   Lipid Panel   Total Cholesterol 129    Triglycerides 48    HDL Cholesterol 54    VLDL Cholesterol 11    LDL Cholesterol  64    LDL/HDL Ratio 1.21     Patient is already on statin therapy..   -Aspirin for primary or secondary prevention: Not applicable, patient is less than age 50.  -Depression and Anxiety screening: Patient has known diagnosis of depression and / or anxiety.  -Diabetes screening:  Screening not indicated at this time.   -Tobacco use screening: Conducted and addressed if indicated.   -Alcohol use screening: Conducted and addressed if indicated.   -Illicit drug screening: Conducted and addressed if indicated.   -Hypertension screening: Patient screened negative for HTN today.  -HIV screening: Patient has already received HIV screening and it  was negative. Patient declined repeat screening today.   -Syphilis screening: Syphilis screening not indicated.  -Hepatitis B virus screening: Patient has known diagnosis of hepatitis B, screening not indicated.  -Hepatitis C virus screening:  Patient has already completed Hepatitis C screening. Negative screening on file.   -Colon cancer screening: Patient is already up to date on their colon cancer screening with colonoscopy and screening is indicated again in   -Lung cancer screening: Patient is less than age 50, screening not indicated.  12 years 1.5PY quit 16 yearago   -Breast cancer screening:  She reports her last mammogram was normal at outside facility, will attempt to obtain records.  -BRCA related cancer screening:  Patient does not have a known personal or family history.   -Osteoporosis screening: Informed patient that the USPSTF recommends screening for osteoporosis with bone measurement testing to prevent osteoporotic fractures in women 65 years and older.           EKG to monitor borderline prolonged QT>     She reports that she has a BP cuff at home. Recommend checking fduring periods of dizziness. Counseled on standing and orthostatic care.     Flu vaccine recommended.     Follow up in 1 year for annual physical exam.    Patient/family had no further questions at this time and verbalized understanding of the plan discussed today.

## 2025-01-31 ENCOUNTER — APPOINTMENT (OUTPATIENT)
Dept: WOMENS IMAGING | Facility: HOSPITAL | Age: 50
End: 2025-01-31
Payer: COMMERCIAL

## 2025-01-31 PROCEDURE — 77062 BREAST TOMOSYNTHESIS BI: CPT | Performed by: RADIOLOGY

## 2025-01-31 PROCEDURE — 76642 ULTRASOUND BREAST LIMITED: CPT | Performed by: RADIOLOGY

## 2025-01-31 PROCEDURE — 77066 DX MAMMO INCL CAD BI: CPT | Performed by: RADIOLOGY

## 2025-01-31 PROCEDURE — G0279 TOMOSYNTHESIS, MAMMO: HCPCS | Performed by: RADIOLOGY

## 2025-02-05 ENCOUNTER — TELEPHONE (OUTPATIENT)
Dept: FAMILY MEDICINE CLINIC | Facility: CLINIC | Age: 50
End: 2025-02-05
Payer: COMMERCIAL

## 2025-02-05 NOTE — TELEPHONE ENCOUNTER
Caller: Marlene Shepherd    Relationship to patient: Self    Best call back number: [phone]     Chief complaint: LAB SCHEDULING    Type of visit: LABS    Requested date: WEEK BEFORE 03/07/2025     Additional notes:PATIENT IS SCHEDULED FOR A PHYSICAL ON 03/07/2025 AND WOULD LIKE TO HAVE HER BLOOD WORK THE WEEK PRIOR TO THAT.     PLEASE CALL PATIENT SCHEDULE.

## 2025-03-07 ENCOUNTER — OFFICE VISIT (OUTPATIENT)
Dept: FAMILY MEDICINE CLINIC | Facility: CLINIC | Age: 50
End: 2025-03-07
Payer: COMMERCIAL

## 2025-03-07 VITALS
HEART RATE: 59 BPM | HEIGHT: 69 IN | WEIGHT: 155.7 LBS | OXYGEN SATURATION: 97 % | RESPIRATION RATE: 18 BRPM | DIASTOLIC BLOOD PRESSURE: 76 MMHG | SYSTOLIC BLOOD PRESSURE: 112 MMHG | TEMPERATURE: 98.4 F | BODY MASS INDEX: 23.06 KG/M2

## 2025-03-07 DIAGNOSIS — Z11.59 NEED FOR HEPATITIS C SCREENING TEST: ICD-10-CM

## 2025-03-07 DIAGNOSIS — Z11.4 ENCOUNTER FOR SCREENING FOR HIV: ICD-10-CM

## 2025-03-07 DIAGNOSIS — Z01.810 ENCOUNTER FOR PRE-OPERATIVE CARDIOVASCULAR CLEARANCE: ICD-10-CM

## 2025-03-07 DIAGNOSIS — Z00.00 ANNUAL PHYSICAL EXAM: Primary | ICD-10-CM

## 2025-03-07 RX ORDER — KETOCONAZOLE 20 MG/ML
1 SHAMPOO, SUSPENSION TOPICAL WEEKLY
COMMUNITY
Start: 2025-02-14

## 2025-03-07 RX ORDER — CLOBETASOL PROPIONATE 0.5 MG/ML
1 SOLUTION TOPICAL WEEKLY
COMMUNITY
Start: 2025-01-15

## 2025-03-07 NOTE — PROGRESS NOTES
Chief Complaint  Annual checkup    HISTORY    Marlene Shepherd is a 49 y.o. female who presents to the office today as a  an established patient for their annual preventative exam.     No hospitalization(s) within the last year.         GYN History:     *Patient's GYN Practice: [facility]     *  S/p hysterectomy             Health Maintenance Summary            Overdue - INFLUENZA VACCINE (Yearly - July to March) Overdue since 7/1/2024      09/15/2023  Imm Admin: Influenza Injectable Mdck Pf Quad    10/05/2022  Imm Admin: Fluzone >6mos    01/30/2018  Declined              Overdue - COVID-19 Vaccine (4 - 2024-25 season) Overdue since 9/1/2024 11/06/2021  Imm Admin: COVID-19 (PFIZER) PURPLE CAP    04/21/2021  Imm Admin: COVID-19 (PFIZER) PURPLE CAP    03/31/2021  Imm Admin: COVID-19 (PFIZER) PURPLE CAP              LIPID PANEL (Yearly) Next due on 2/20/2026 02/20/2025  Lipid Panel    10/27/2023  Lipid Panel With LDL / HDL Ratio    08/09/2023  Lipid Panel With LDL / HDL Ratio    04/04/2022  Lipid Panel With LDL / HDL Ratio    10/23/2020  Lipid Panel With LDL / HDL Ratio    Only the first 5 history entries have been loaded, but more history exists.              ANNUAL PHYSICAL (Yearly) Next due on 3/7/2026      03/07/2025  Done    08/09/2023  Registry Metric: Last Annual Physical              PAP SMEAR (Every 3 Years) Next due on 8/8/2026 08/08/2023  Patient-Reported (Performed Externally)    06/22/2017  Patient-Reported (Performed Externally) - gyn              MAMMOGRAM (Every 2 Years) Next due on 10/9/2026      10/09/2024  Done              COLORECTAL CANCER SCREENING (COLONOSCOPY - Every 3 Years) Tentatively due on 9/18/2027 09/18/2024  Colonoscopy    09/18/2024  Surgical Procedure: COLONOSCOPY    07/29/2021  COLONOSCOPY    07/29/2021  Surgical Procedure: COLONOSCOPY    04/21/2006  SCANNED - COLONOSCOPY    Only the first 5 history entries have been loaded, but more history  exists.              TDAP/TD VACCINES (2 - Td or Tdap) Next due on 10/29/2030      10/29/2020  Imm Admin: Tdap              HEPATITIS C SCREENING  Completed      2022  HCV Antibody Reflex To [facility]              Pneumococcal Vaccine 0-49 (Series Information) Aged Out      No completion history exists for this topic.                     Allergies   Allergen Reactions    Latex Rash        Outpatient Medications Marked as Taking for the 3/7/25 encounter (Office Visit) with Delgado Dsouza, DO   Medication Sig Dispense Refill    atorvastatin (LIPITOR) 20 MG tablet Take 1 tablet by mouth Daily. 30 tablet 11    Cholecalciferol 25 MCG (1000 UT) tablet Take 3 tablets by mouth Daily.      clobetasol (TEMOVATE) 0.05 % external solution Apply 1 Application topically to the appropriate area as directed 1 (One) Time Per Week.      FLUoxetine (PROzac) 40 MG capsule Take 1 capsule by mouth Daily. 30 capsule 11    ketoconazole (NIZORAL) 2 % shampoo Apply 1 Application topically to the appropriate area as directed 1 (One) Time Per Week.      Lyllana 0.05 MG/24HR patch Place 1 patch on the skin as directed by provider 1 (One) Time Per Week.      vitamin B-12 (CYANOCOBALAMIN) 100 MCG tablet Take 1 tablet by mouth Daily.          Past Medical History:   Diagnosis Date    Abnormal uterine bleeding (AUB) 2015    Allergic rhinitis     Anal skin tag 2021    S/P EXCISION    Anxiety     Colon polyps     FOLLOWED BY DR. DAVE BAINS    Decreased libido     Elevated cholesterol     Endometrial mass 2015    Hemorrhoids     Hyperlipidemia     Idiopathic scoliosis of thoracic spine 2018    Dextroscoliosis of the thoracic spine    Infertility, female     Influenza A 2018    Laryngeal spasm 10/2018    Migraines     Recurrent cold sores 2018    SAB (spontaneous ) 2013     A1    Uterine fibroid     Vitamin D deficiency     Vulvodynia      Past Surgical History:   Procedure Laterality Date    COLONOSCOPY  N/A 04/21/2006    HEMORRHOIDS, DR. DARLENE BAINS AT  [facility]    COLONOSCOPY N/A 07/29/2021    5 POLYPS REMOVED, ALL BENIGN EXCEPT FOR POLYP IN TRANSVERSE WAS TUBULAR ADENOMA , ANAL SKIN TAGS BENIGN, RESCOPE IN 3 YRS, DR. DAVE BAINS AT [facility]    COLONOSCOPY N/A 09/18/2024    1 BENIGN POLYP IN RECTUM, 1 BENIGN POLYP IN SIGMOID, 1 TUBULAR ADENOMA POLYP IN ASCENDING, TORTUOUS COLON, DIVERTICULA IN SIGMOID, RESCOPE IN 3YRS, DR. DAVE BAINS AT [facility]    DILATATION AND CURETTAGE N/A 06/26/2013    FOR SAB, DR. LAVELL BLANCA AT [facility]    ENDOSCOPY      HYSTERECTOMY N/A 06/30/2015    LAPAROSCOPIC VAGINAL HYSTERECTOMY WITH BSO, DR. CONCEPCION ROMAN AT [facility]    HYSTEROSCOPY MYOMECTOMY N/A 05/19/2015    ENDOMETRIAL MASS, DR. CONCEPCION ROMAN AT [facility]     Family History   Problem Relation Age of Onset    Alcohol abuse Mother     Hypertension Mother     Alcohol abuse Father     Hypertension Father     Colon polyps Father     Hypertension Brother     Cancer Maternal Grandmother     Kidney disease Maternal Grandmother     Alcohol abuse Maternal Grandfather     Cancer Maternal Grandfather     Kidney cancer Maternal Grandfather     Brain cancer Paternal Grandmother     Cancer Paternal Grandmother     COPD Paternal Grandfather     Depression Other     Gout Other     Hyperlipidemia Other     Colon cancer Other     Diabetes Other     Colon polyps Other     Malig Hyperthermia Neg Hx     reports that she quit smoking about 17 years ago. Her smoking use included cigarettes. She started smoking about 32 years ago. She has a 15 pack-year smoking history. She has been exposed to tobacco smoke. She has never used smokeless tobacco. She reports that she does not currently use alcohol. She reports that she does not use drugs.    Immunization History   Administered Date(s) Administered    COVID-19 (PFIZER) Purple Cap Monovalent 03/31/2021, 04/21/2021, 11/06/2021    Fluzone (or Fluarix & Flulaval for VFC) >6mos 10/05/2022    Influenza Injectable Mdck Pf Quad  "09/15/2023    Tdap 10/29/2020        OBJECTIVE    Vital Signs:   /76 (BP Location: Left arm, Patient Position: Sitting, Cuff Size: Adult)   Pulse 59   Temp 98.4 °F (36.9 °C) (Temporal)   Resp 18   Ht 175.3 cm (69.02\")   Wt 70.6 kg (155 lb 11.2 oz)   SpO2 97%   BMI 22.98 kg/m²     Physical Exam                    The 10-year ASCVD risk score (Pj CRAFT, et al., 2019) is: 0.5%    Values used to calculate the score:      Age: 49 years      Sex: Female      Is Non- : No      Diabetic: No      Tobacco smoker: No      Systolic Blood Pressure: 112 mmHg      Is BP treated: No      HDL Cholesterol: 58 mg/dL      Total Cholesterol: 138 mg/dL     ASSESSMENT & PLAN     #Annual Preventative Health Examination   -Age and sex appropriate physical exam performed and documented. Updated past medical, family, social and surgical histories as well as allergies and care team list. Addressed care gaps listed in the medical record.  -Encouraged annual dental and vision exams as part of their overall health.  -Encouraged minimum of 30 minutes or more of exercise at a brisk walk or higher 5 days per week combined with a well-balanced diet.  -Advised that all women who are planning or capable of pregnancy take a daily supplement containing 0.4 to 0.8 mg (400 to 800 ?g) of folic acid.  -Immunizations reviewed and updated in EMR. Influenza recommended.  -Lipid screening:   Lipid Panel          2/20/2025    08:44   Lipid Panel   Total Cholesterol 138    Triglycerides 37    HDL Cholesterol 58    VLDL Cholesterol 9    LDL Cholesterol  71       -Aspirin for primary or secondary prevention: Not applicable, patient is less than age 50.  -Depression and Anxiety screening: Patient denies symptom of anxiety or depression.  -Diabetes screening:  Screening not indicated at this time.   -Tobacco use screening: Conducted and addressed if indicated.   -Alcohol use screening: Conducted and addressed if indicated. "   -Illicit drug screening: Conducted and addressed if indicated.   -Hypertension screening: Patient screened negative for HTN today.  -HIV screening: not indicated   -Syphilis screening: Syphilis screening not indicated.  -Hepatitis B virus screening: Patient has already completed screening.  -Hepatitis C virus screening:  Patient has already completed Hepatitis C screening. Negative screening on file.   -Colon cancer screening:Three 4 to 5 mm polyps in the rectum, in the sigmoid colon and in the ascending colon, removed with a cold biopsy forceps. Resected and retrieved. - Diverticulosis in the sigmoid colon. Impression: - Repeat colonoscopy in 3 years for surveillance DUE 2027   -Lung cancer screening: Patient has smoked but does not meet other eligibility criteria for screening. Currently. She does not smoke. She smoked 1-2packs per year for 15 years.   -Cervical cancer screening:    -Breast cancer screening:  She reports her last mammogram was normal at outside facility, will attempt to obtain records.  -BRCA related cancer screening:  Patient does not have a known personal or family history.   -Osteoporosis screening: Informed patient that the USPSTF recommends screening for osteoporosis with bone measurement testing to prevent osteoporotic fractures in women 65 years and older. Pt has not been screened for osteoporosis.    Follow up in 1 year for annual physical exam.    Patient/family had no further questions at this time and verbalized understanding of the plan discussed today.     Delgado Dsouza, DO      She is having facial plastic surgery in June 025. She reports that she needs testing prior to exam

## 2025-03-24 ENCOUNTER — TELEPHONE (OUTPATIENT)
Dept: FAMILY MEDICINE CLINIC | Facility: CLINIC | Age: 50
End: 2025-03-24

## 2025-03-24 NOTE — TELEPHONE ENCOUNTER
Hub staff attempted to follow warm transfer process and was unsuccessful     Caller: Marlene Shepherd    Relationship to patient: Self    Best call back number:     Marlene Shepherd (Self) [phone] (Home)       Patient is needing: RESCHEDULE HER LAB APPT PLEASE   MORNINGS THE WEEK OF THE 12TH OF MAY

## 2025-04-04 ENCOUNTER — HOSPITAL ENCOUNTER (OUTPATIENT)
Dept: GENERAL RADIOLOGY | Facility: HOSPITAL | Age: 50
Discharge: HOME | End: 2025-04-04
Payer: COMMERCIAL

## 2025-04-04 DIAGNOSIS — Z01.810 ENCOUNTER FOR PRE-OPERATIVE CARDIOVASCULAR CLEARANCE: ICD-10-CM

## 2025-04-04 PROCEDURE — 71046 X-RAY EXAM CHEST 2 VIEWS: CPT

## 2025-04-07 ENCOUNTER — RESULTS FOLLOW-UP (OUTPATIENT)
Dept: FAMILY MEDICINE CLINIC | Facility: CLINIC | Age: 50
End: 2025-04-07

## 2025-04-09 ENCOUNTER — OFFICE VISIT (OUTPATIENT)
Dept: FAMILY MEDICINE CLINIC | Facility: CLINIC | Age: 50
End: 2025-04-09
Payer: COMMERCIAL

## 2025-04-09 VITALS
TEMPERATURE: 96.8 F | HEART RATE: 86 BPM | HEIGHT: 69 IN | RESPIRATION RATE: 16 BRPM | DIASTOLIC BLOOD PRESSURE: 70 MMHG | BODY MASS INDEX: 22.96 KG/M2 | OXYGEN SATURATION: 99 % | SYSTOLIC BLOOD PRESSURE: 110 MMHG | WEIGHT: 155 LBS

## 2025-04-09 DIAGNOSIS — F41.1 ANXIETY, GENERALIZED: ICD-10-CM

## 2025-04-09 DIAGNOSIS — Z79.890 HORMONE REPLACEMENT THERAPY: ICD-10-CM

## 2025-04-09 DIAGNOSIS — Z01.818 PRE-OPERATIVE CLEARANCE: Primary | ICD-10-CM

## 2025-04-09 DIAGNOSIS — E78.5 HYPERLIPIDEMIA, UNSPECIFIED HYPERLIPIDEMIA TYPE: ICD-10-CM

## 2025-04-09 NOTE — PROGRESS NOTES
"Chief Complaint  Pre-op Exam    Subjective      Marlene Shepherd presents to [facility]  History of Present Illness    Patient here for follow up.   She has a history of hyperlipidemia and generalized anxiety.     Objective   Vital Signs:  /70 (BP Location: Left arm, Patient Position: Sitting, Cuff Size: Adult)   Pulse 86   Temp 96.8 °F (36 °C) (Temporal)   Resp 16   Ht 175.3 cm (69.02\")   Wt 70.3 kg (155 lb)   SpO2 99%   BMI 22.88 kg/m²   Estimated body mass index is 22.88 kg/m² as calculated from the following:    Height as of this encounter: 175.3 cm (69.02\").    Weight as of this encounter: 70.3 kg (155 lb).      Physical Exam  Constitutional:       Appearance: Normal appearance. She is not ill-appearing, toxic-appearing or diaphoretic.   HENT:      Head: Normocephalic. No raccoon eyes, contusion, masses or laceration.      Nose: Nose normal.   Eyes:      General: No scleral icterus.        Right eye: No discharge.         Left eye: No discharge.      Extraocular Movements: Extraocular movements intact.      Pupils: Pupils are equal, round, and reactive to light.   Neck:      Thyroid: No thyromegaly.      Vascular: No JVD.   Cardiovascular:      Rate and Rhythm: Normal rate and regular rhythm.      Pulses: Normal pulses.   Pulmonary:      Effort: No accessory muscle usage or respiratory distress.      Breath sounds: Normal breath sounds. No stridor. No wheezing, rhonchi or rales.   Chest:      Chest wall: No tenderness.   Abdominal:      General: There is no distension.      Palpations: Abdomen is soft. There is no fluid wave or pulsatile mass.      Tenderness: There is no abdominal tenderness. There is no guarding.   Musculoskeletal:         General: No swelling, tenderness or deformity.      Cervical back: Neck supple. No rigidity.   Skin:     General: Skin is warm and dry.      Coloration: Skin is not jaundiced.   Neurological:      General: No focal deficit " present.      Mental Status: She is alert and oriented to person, place, and time.   Psychiatric:         Mood and Affect: Mood normal.         Thought Content: Thought content normal.         Judgment: Judgment normal.        Result Review :                 Assessment and Plan   Assessment & Plan      Assessment & Plan  Hyperlipidemia, unspecified hyperlipidemia type     Anxiety, generalized    Pre-operative clearance  DUKE activity score 58  49F presented for presurgical evaluation for a elective mild to moderate risk face lift procedure. She has no cardiac risk factors. She has a high level of functional capacity. She has a normal EKG and normal CXR, and routine bloodwork as requested by surgeon. She has no history of cardiac surgery and few clinical risk factors related to surgical risk. She was recommended that there are no outstanding modifiable risk factors, and she is low risk for complications from her medical history. Recommended she inquire further on whether to discontinue HRT prior to procedure.    EKG: normal EKG, normal sinus rhythm, unchanged from previous tracings.    Hormone replacement therapy       Follow Up   Return in about 6 months (around 10/9/2025) for Recheck.  Patient was given instructions and counseling regarding her condition or for health maintenance advice. Please see specific information pulled into the AVS if appropriate.       Diagnoses and all orders for this visit:    1. Pre-operative clearance (Primary)    2. Hyperlipidemia, unspecified hyperlipidemia type  Assessment & Plan:         3. Anxiety, generalized  Assessment & Plan:      Orders:  -     ECG 12 Lead    4. Hormone replacement therapy

## 2025-05-16 LAB
APTT PPP: 27 SEC (ref 24–33)
BASOPHILS # BLD AUTO: 0 X10E3/UL (ref 0–0.2)
BASOPHILS NFR BLD AUTO: 1 %
BUN SERPL-MCNC: 14 MG/DL (ref 6–24)
BUN/CREAT SERPL: 17 (ref 9–23)
CALCIUM SERPL-MCNC: 9.9 MG/DL (ref 8.7–10.2)
CHLORIDE SERPL-SCNC: 98 MMOL/L (ref 96–106)
CO2 SERPL-SCNC: 23 MMOL/L (ref 20–29)
CREAT SERPL-MCNC: 0.82 MG/DL (ref 0.57–1)
EGFRCR SERPLBLD CKD-EPI 2021: 87 ML/MIN/1.73
EOSINOPHIL # BLD AUTO: 0.1 X10E3/UL (ref 0–0.4)
EOSINOPHIL NFR BLD AUTO: 2 %
ERYTHROCYTE [DISTWIDTH] IN BLOOD BY AUTOMATED COUNT: 12.2 % (ref 11.7–15.4)
GLUCOSE SERPL-MCNC: 86 MG/DL (ref 70–99)
HBA1C MFR BLD: 5.7 % (ref 4.8–5.6)
HCT VFR BLD AUTO: 41.3 % (ref 34–46.6)
HCV IGG SERPL QL IA: NON REACTIVE
HGB BLD-MCNC: 13.3 G/DL (ref 11.1–15.9)
HIV 1+2 AB+HIV1 P24 AG SERPL QL IA: NON REACTIVE
IMM GRANULOCYTES # BLD AUTO: 0 X10E3/UL (ref 0–0.1)
IMM GRANULOCYTES NFR BLD AUTO: 0 %
INR PPP: 1 (ref 0.9–1.2)
LYMPHOCYTES # BLD AUTO: 2.1 X10E3/UL (ref 0.7–3.1)
LYMPHOCYTES NFR BLD AUTO: 35 %
MCH RBC QN AUTO: 30.2 PG (ref 26.6–33)
MCHC RBC AUTO-ENTMCNC: 32.2 G/DL (ref 31.5–35.7)
MCV RBC AUTO: 94 FL (ref 79–97)
MONOCYTES # BLD AUTO: 0.4 X10E3/UL (ref 0.1–0.9)
MONOCYTES NFR BLD AUTO: 7 %
NEUTROPHILS # BLD AUTO: 3.2 X10E3/UL (ref 1.4–7)
NEUTROPHILS NFR BLD AUTO: 55 %
PLATELET # BLD AUTO: 293 X10E3/UL (ref 150–450)
POTASSIUM SERPL-SCNC: 4.4 MMOL/L (ref 3.5–5.2)
PROTHROMBIN TIME: 11.1 SEC (ref 9.1–12)
RBC # BLD AUTO: 4.41 X10E6/UL (ref 3.77–5.28)
SODIUM SERPL-SCNC: 136 MMOL/L (ref 134–144)
WBC # BLD AUTO: 5.9 X10E3/UL (ref 3.4–10.8)

## (undated) DEVICE — KT ORCA ORCAPOD DISP STRL

## (undated) DEVICE — GOWN SURG AERO CHROME XL

## (undated) DEVICE — ADAPT CLN BIOGUARD AIR/H2O DISP

## (undated) DEVICE — LN SMPL CO2 SHTRM SD STREAM W/M LUER

## (undated) DEVICE — SINGLE-USE BIOPSY FORCEPS: Brand: RADIAL JAW 4

## (undated) DEVICE — CANN O2 ETCO2 FITS ALL CONN CO2 SMPL A/ 7IN DISP LF

## (undated) DEVICE — SNAR POLYP SENSATION STDOVL 27 240 BX40

## (undated) DEVICE — TUBING, SUCTION, 1/4" X 10', STRAIGHT: Brand: MEDLINE

## (undated) DEVICE — SENSR O2 OXIMAX FNGR A/ 18IN NONSTR

## (undated) DEVICE — THE SINGLE USE ETRAP – POLYP TRAP IS USED FOR SUCTION RETRIEVAL OF ENDOSCOPICALLY REMOVED POLYPS.: Brand: ETRAP